# Patient Record
Sex: FEMALE | Race: WHITE
[De-identification: names, ages, dates, MRNs, and addresses within clinical notes are randomized per-mention and may not be internally consistent; named-entity substitution may affect disease eponyms.]

---

## 2017-03-29 ENCOUNTER — HOSPITAL ENCOUNTER (INPATIENT)
Dept: HOSPITAL 54 - ER | Age: 62
LOS: 1 days | Discharge: ANOTHER HOSPITAL | DRG: 544 | End: 2017-03-30
Attending: INTERNAL MEDICINE | Admitting: FAMILY MEDICINE
Payer: MEDICARE

## 2017-03-29 VITALS — HEIGHT: 67 IN | BODY MASS INDEX: 26.21 KG/M2 | WEIGHT: 167 LBS

## 2017-03-29 DIAGNOSIS — M80.08XA: Primary | ICD-10-CM

## 2017-03-29 DIAGNOSIS — M47.9: ICD-10-CM

## 2017-03-29 DIAGNOSIS — F29: ICD-10-CM

## 2017-03-29 DIAGNOSIS — E87.6: ICD-10-CM

## 2017-03-29 DIAGNOSIS — I10: ICD-10-CM

## 2017-03-29 DIAGNOSIS — F17.210: ICD-10-CM

## 2017-03-29 DIAGNOSIS — M48.06: ICD-10-CM

## 2017-03-29 DIAGNOSIS — M48.56XA: ICD-10-CM

## 2017-03-29 DIAGNOSIS — G89.29: ICD-10-CM

## 2017-03-29 NOTE — NUR
BB  FROM HOME. C/C CHRONIC LOW BACK PAIN.  PT STATES HX OF LUMBAR FX 
APPROX 20 YEARS AGO.  A+OX4.  SKIN WARM, DRY.  AFEBRILE.  RR EVEN, UNLABORED.  
VSS, NAD NOTED.  SHEETED TO ER BED 11 BY EMS PARAMEDICS.  AWAITING MD DAVALOS AND 
FURTHER ORDERS.

## 2017-03-30 VITALS — DIASTOLIC BLOOD PRESSURE: 106 MMHG | SYSTOLIC BLOOD PRESSURE: 181 MMHG

## 2017-03-30 VITALS — DIASTOLIC BLOOD PRESSURE: 98 MMHG | SYSTOLIC BLOOD PRESSURE: 175 MMHG

## 2017-03-30 VITALS — DIASTOLIC BLOOD PRESSURE: 96 MMHG | SYSTOLIC BLOOD PRESSURE: 145 MMHG

## 2017-03-30 VITALS — SYSTOLIC BLOOD PRESSURE: 175 MMHG | DIASTOLIC BLOOD PRESSURE: 98 MMHG

## 2017-03-30 LAB
APTT PPP: 23 SEC (ref 23–34)
BASOPHILS # BLD AUTO: 0 /CMM (ref 0–0.2)
BASOPHILS NFR BLD AUTO: 0.1 % (ref 0–2)
BUN SERPL-MCNC: 8 MG/DL (ref 7–18)
CALCIUM SERPL-MCNC: 8.9 MG/DL (ref 8.5–10.1)
CHLORIDE SERPL-SCNC: 106 MMOL/L (ref 98–107)
CO2 SERPL-SCNC: 24 MMOL/L (ref 21–32)
CREAT SERPL-MCNC: 0.7 MG/DL (ref 0.6–1.3)
EOSINOPHIL # BLD AUTO: 0.1 /CMM (ref 0–0.7)
EOSINOPHIL NFR BLD AUTO: 0.6 % (ref 0–6)
GFR SERPLBLD BASED ON 1.73 SQ M-ARVRAT: 85 ML/MIN (ref 60–?)
GLUCOSE SERPL-MCNC: 103 MG/DL (ref 74–106)
HCT VFR BLD AUTO: 49 % (ref 33–45)
HGB BLD-MCNC: 17.1 G/DL (ref 11.5–14.8)
INR PPP: 1 (ref 0.87–1.13)
LYMPHOCYTES NFR BLD AUTO: 1.5 /CMM (ref 0.8–4.8)
LYMPHOCYTES NFR BLD AUTO: 16.6 % (ref 20–44)
MCH RBC QN AUTO: 33 PG (ref 26–33)
MCHC RBC AUTO-ENTMCNC: 35 G/DL (ref 31–36)
MCV RBC AUTO: 93 FL (ref 82–100)
MONOCYTES NFR BLD AUTO: 0.3 /CMM (ref 0.1–1.3)
MONOCYTES NFR BLD AUTO: 3.9 % (ref 2–12)
NEUTROPHILS # BLD AUTO: 7 /CMM (ref 1.8–8.9)
NEUTROPHILS NFR BLD AUTO: 78.8 % (ref 43–81)
PLATELET # BLD AUTO: 192 /CMM (ref 150–450)
POTASSIUM SERPL-SCNC: 3.2 MMOL/L (ref 3.5–5.1)
PROTHROMBIN TIME: 10.7 SECS (ref 9.5–12.7)
RBC # BLD AUTO: 5.25 MIL/UL (ref 4–5.2)
RDW COEFFICIENT OF VARIATION: 12.5 (ref 11.5–15)
SODIUM SERPL-SCNC: 143 MMOL/L (ref 136–145)
WBC NRBC COR # BLD AUTO: 8.8 K/UL (ref 4.3–11)

## 2017-03-30 RX ADMIN — Medication PRN MG: at 12:07

## 2017-03-30 RX ADMIN — HYDROMORPHONE HYDROCHLORIDE PRN MG: 1 INJECTION, SOLUTION INTRAMUSCULAR; INTRAVENOUS; SUBCUTANEOUS at 20:21

## 2017-03-30 RX ADMIN — Medication PRN MG: at 04:26

## 2017-03-30 RX ADMIN — Medication PRN EACH: at 16:01

## 2017-03-30 RX ADMIN — Medication PRN MG: at 02:30

## 2017-03-30 RX ADMIN — Medication PRN MG: at 09:14

## 2017-03-30 RX ADMIN — Medication PRN MG: at 06:38

## 2017-03-30 RX ADMIN — Medication PRN EACH: at 22:10

## 2017-03-30 RX ADMIN — HYDROMORPHONE HYDROCHLORIDE PRN MG: 1 INJECTION, SOLUTION INTRAMUSCULAR; INTRAVENOUS; SUBCUTANEOUS at 14:14

## 2017-03-30 RX ADMIN — Medication PRN MG: at 04:30

## 2017-03-30 NOTE — NUR
Will need higher level of care transfer for neuro-surgery thoracic burst fracture with 
stenosis.Referred to Pat at [facility] transfer ctr [phone], faxed 
clinicals,accepting MD will be . Awaiting financial clearance for acceptance  

-------------------------------------------------------------------------------

Addendum: 03/30/17 at 1632 by FOZIA LYLES RN

-------------------------------------------------------------------------------

Amended: Links added.

## 2017-03-30 NOTE — NUR
RN NOTES:



Pt's Potassium Level is 3.2 (low), ordered to give K-dur 40 meqs tablet but pt unable to 
swallow tablet. Called Pharmacy & spoke w/ Tiffanie, tablet shifted to Klor Con powder 40 
meqs instead.

## 2017-03-30 NOTE — NUR
RN CLOSING NOTE



EMS ARRIVED TO TRANSPORT PT TO [facility]. PT REMAINS IN NO ACUTE DISTRESS IN 
BED. PT DID NOT HAVE ANY SIGNIFICANT CHANGE IN CONDITION DURING SHIFT. PT LEFT HOSPITAL IN 
STABLE CONDITION WITH EMS.

## 2017-03-30 NOTE — NUR
RN NOTES:



Pt seen & examined by Dr. Lindsay w/ orders & carried out. Orders to DC Morphine 2 mg IVP q2H 
& start on Dilaudid 2 mg IVP q6h PRN.

## 2017-03-30 NOTE — NUR
Referred to Jojo at [facility] per Noe- NP of  request [phone]. 
Faxed clinicals and face sheet. Pt has been accepted - room# 6522, report to be called to 
[phone] Address: [facility] [place] [address]. Arranged with [facility] , available 
ambulance for pick will be 9:30-10pm tonight. Patient is aware and agreed with the transfer

## 2017-03-30 NOTE — NUR
RN NOTES:



Called Geropysche unit c/o Chao re: ordered pyschiatric evaluation of the pt. Faxed face 
sheet c/o Amandeep.

## 2017-03-30 NOTE — NUR
RN NOTES:



Rec'd call from ADRIEN Lopez, pt is for transfer to [facility] under Dr. Garza. 
Dr. Lindsay informed of the plan w/ no orders made.

## 2017-03-30 NOTE — NUR
RN INITIAL NOTES:



PT RECEIVED FROM ER. PT IS ALERT AND ORIENTED X 4. PT HAS COLEMAN CATH DRAINING WELL. PT HAS 
IV HEPLOCK IN RIGHT HAND. FLUSHED AND PATENT WITH NO SIGNS OF INFILTRATION. PT IS ON ROOM 
AIR AND TOLERATING WELL. BED IN LOW POSITION AND LOCKED WITH SIDE RAILS UP. CALL LIGHT 
WITHIN REACH.

## 2017-03-30 NOTE — NUR
RN NOTES:



As per SHILPI Hampton, pt is for transfer to [facility] at [facility] for surgical 
procedure instead. Pt informed about the hospital change & agreed on it.

## 2017-03-30 NOTE — NUR
RN CLOSING NOTES:



No acute changes noted w/in shift. Pt not in any distress. Pt c/o back pain d/t compression 
fracture on T12, pain medication given as scheduled. Pt kept flat w/ HOB slightly elevated. 
FC patent & intact w/ adequate urine output. Instructed to be in supine position due to 
condition. Verbalized understanding. Dr. Lindsay made aware of hospital change. Needs 
attended. Kept comfortable & warm. Call light placed w/in reached. Bed kept low & in locked 
position. Pt endorsed to TRUONG Denny.

## 2017-03-30 NOTE — NUR
RN CLOSING NOTES:



PT IS RESTING IN BED WITH NO S/S OF DISTRESS. BED IN LOW LOCKED POSITION. CALL LIGHT WITHIN 
REACH. CARE PROVIDED AS ORDERED. ENDORSE PT TO AM SHIFT FOR NEHEMIAS.

## 2017-03-30 NOTE — NUR
RN NOTES:



Informed Dr. Lindsay regarding the transfer to hospital. SHILPI Hampton made the order for transfer 
in behalf of Dr. Lindsay. All DC paperworks done c/o SHILPI Hampton.

## 2017-03-30 NOTE — NUR
RN INITIAL NOTE



RECEIVED PT IN NO ACUTE DISTRESS IN BED. PT IS A/O X 4 AND ABLE TO MAKE NEEDS KNOWN. PT IS 
C/O PAIN AT THIS TIME. PAIN MANAGEMENT INITIATED AND PT HAS DILAUDID 2MG Q6H DUE @ 2015. PT 
IS NOT C/O ANY SOB OR DIFFICULTY BREATHING. PT HAS F/C THAT IS CLEAN DRY INTACT AND PATENT 
WITH CLEAR YELLOW URINE DRAINING. PT HAS RHAND 22G THAT IS CLEAN DRY INTACT AND PATENT WITH 
SALINE FLUSH. PT AWAITING TRANSFER TO [facility] FOR SURGERY IN AM. AWAITING 
EMS ARRIVAL TO TRANSPORT PT. BED IN LOW LOCK POSITION WITH RIALS UP X 2. CALL LIGHT WITHIN 
REACH AND ALL SAFETY MEASURES ENSURED AND CARRIED OUT. WILL CONTINUE TO MONITOR PT.

## 2017-03-30 NOTE — NUR
RN INITIAL NOTES:



Rec'd pt awake on bed, A&Ox4, c/o severe pain on lower back, 10/10 rate, pt on pain meds. Pt 
has R hand G22 SL, flushed, patent & intact w/ no signs of infection/ infiltration noted. 
Call light placed w/in reached. Provided comfort. Bed kept low & in locked position. Will 
continue to monitor.

## 2017-11-07 ENCOUNTER — HOSPITAL ENCOUNTER (INPATIENT)
Dept: HOSPITAL 10 - E/R | Age: 62
LOS: 4 days | Discharge: HOME | DRG: 446 | End: 2017-11-11
Attending: INTERNAL MEDICINE | Admitting: INTERNAL MEDICINE
Payer: MEDICARE

## 2017-11-07 VITALS — SYSTOLIC BLOOD PRESSURE: 164 MMHG | DIASTOLIC BLOOD PRESSURE: 93 MMHG | RESPIRATION RATE: 18 BRPM

## 2017-11-07 VITALS
HEIGHT: 67 IN | BODY MASS INDEX: 25.43 KG/M2 | WEIGHT: 162.04 LBS | BODY MASS INDEX: 25.43 KG/M2 | WEIGHT: 162.04 LBS | HEIGHT: 67 IN

## 2017-11-07 VITALS — HEART RATE: 89 BPM | DIASTOLIC BLOOD PRESSURE: 84 MMHG | SYSTOLIC BLOOD PRESSURE: 138 MMHG

## 2017-11-07 VITALS — TEMPERATURE: 98.3 F

## 2017-11-07 DIAGNOSIS — F17.200: ICD-10-CM

## 2017-11-07 DIAGNOSIS — F11.99: ICD-10-CM

## 2017-11-07 DIAGNOSIS — M40.204: ICD-10-CM

## 2017-11-07 DIAGNOSIS — K76.0: ICD-10-CM

## 2017-11-07 DIAGNOSIS — K80.51: Primary | ICD-10-CM

## 2017-11-07 DIAGNOSIS — I10: ICD-10-CM

## 2017-11-07 DIAGNOSIS — G89.29: ICD-10-CM

## 2017-11-07 DIAGNOSIS — R33.9: ICD-10-CM

## 2017-11-07 LAB
ADD UMIC: YES
ALBUMIN SERPL-MCNC: 4.3 G/DL (ref 3.3–4.9)
ALBUMIN/GLOB SERPL: 1.07 {RATIO}
ALP SERPL-CCNC: 129 IU/L (ref 42–121)
ALT SERPL-CCNC: 80 IU/L (ref 13–69)
ANION GAP SERPL CALC-SCNC: 18 MMOL/L (ref 8–16)
AST SERPL-CCNC: 83 IU/L (ref 15–46)
BACTERIA #/AREA URNS HPF: (no result) /HPF
BASOPHILS # BLD AUTO: 0 10^3/UL (ref 0–0.1)
BASOPHILS NFR BLD: 0.4 % (ref 0–2)
BILIRUB DIRECT SERPL-MCNC: 0 MG/DL (ref 0–0.2)
BILIRUB SERPL-MCNC: 0.6 MG/DL (ref 0.2–1.3)
BUN SERPL-MCNC: 12 MG/DL (ref 7–20)
CALCIUM SERPL-MCNC: 8.9 MG/DL (ref 8.4–10.2)
CHLORIDE SERPL-SCNC: 100 MMOL/L (ref 97–110)
CO2 SERPL-SCNC: 27 MMOL/L (ref 21–31)
COLOR UR: YELLOW
CREAT SERPL-MCNC: 0.69 MG/DL (ref 0.44–1)
EOSINOPHIL # BLD: 0 10^3/UL (ref 0–0.5)
EOSINOPHIL NFR BLD: 0.1 % (ref 0–7)
ERYTHROCYTE [DISTWIDTH] IN BLOOD BY AUTOMATED COUNT: 11.9 % (ref 11.5–14.5)
GLOBULIN SER-MCNC: 4 G/DL (ref 1.3–3.2)
GLUCOSE SERPL-MCNC: 122 MG/DL (ref 70–220)
GLUCOSE UR STRIP-MCNC: NEGATIVE MG/DL
HCT VFR BLD CALC: 46 % (ref 37–47)
HGB BLD-MCNC: 15.7 G/DL (ref 12–16)
KETONES UR STRIP.AUTO-MCNC: (no result) MG/DL
LYMPHOCYTES # BLD AUTO: 1.2 10^3/UL (ref 0.8–2.9)
LYMPHOCYTES NFR BLD AUTO: 12.5 % (ref 15–51)
MCH RBC QN AUTO: 31.5 PG (ref 29–33)
MCHC RBC AUTO-ENTMCNC: 34.1 G/DL (ref 32–37)
MCV RBC AUTO: 92.4 FL (ref 82–101)
MONOCYTES # BLD: 0.7 10^3/UL (ref 0.3–0.9)
MONOCYTES NFR BLD: 7.4 % (ref 0–11)
MUCOUS THREADS #/AREA URNS HPF: (no result) /HPF
NEUTROPHILS # BLD: 7.3 10^3/UL (ref 1.6–7.5)
NEUTROPHILS NFR BLD AUTO: 79.2 % (ref 39–77)
NITRITE UR QL STRIP.AUTO: NEGATIVE MG/DL
NRBC # BLD MANUAL: 0 10^3/UL (ref 0–0)
NRBC BLD AUTO-RTO: 0 /100WBC (ref 0–0)
PLATELET # BLD: 201 10^3/UL (ref 140–415)
PMV BLD AUTO: 9.3 FL (ref 7.4–10.4)
POTASSIUM SERPL-SCNC: 4 MMOL/L (ref 3.5–5.1)
PROT SERPL-MCNC: 8.3 G/DL (ref 6.1–8.1)
RBC # BLD AUTO: 4.98 10^6/UL (ref 4.2–5.4)
RBC # UR AUTO: NEGATIVE MG/DL
SODIUM SERPL-SCNC: 141 MMOL/L (ref 135–144)
SQUAMOUS #/AREA URNS HPF: (no result) /HPF
UR ASCORBIC ACID: NEGATIVE MG/DL
UR BILIRUBIN (DIP): NEGATIVE MG/DL
UR CLARITY: (no result)
UR PH (DIP): 8 (ref 5–9)
UR RBC: 3 /HPF (ref 0–5)
UR SPECIFIC GRAVITY (DIP): 1.01 (ref 1–1.03)
UR TOTAL PROTEIN (DIP): NEGATIVE MG/DL
UROBILINOGEN UR STRIP-ACNC: NEGATIVE MG/DL
WBC # BLD AUTO: 9.2 10^3/UL (ref 4.8–10.8)
WBC # UR STRIP: (no result) LEU/UL

## 2017-11-07 PROCEDURE — 74330 X-RAY BILE/PANC ENDOSCOPY: CPT

## 2017-11-07 PROCEDURE — 70450 CT HEAD/BRAIN W/O DYE: CPT

## 2017-11-07 PROCEDURE — 74176 CT ABD & PELVIS W/O CONTRAST: CPT

## 2017-11-07 PROCEDURE — C2617 STENT, NON-COR, TEM W/O DEL: HCPCS

## 2017-11-07 PROCEDURE — 85025 COMPLETE CBC W/AUTO DIFF WBC: CPT

## 2017-11-07 PROCEDURE — 80053 COMPREHEN METABOLIC PANEL: CPT

## 2017-11-07 PROCEDURE — 74181 MRI ABDOMEN W/O CONTRAST: CPT

## 2017-11-07 PROCEDURE — 96375 TX/PRO/DX INJ NEW DRUG ADDON: CPT

## 2017-11-07 PROCEDURE — 81003 URINALYSIS AUTO W/O SCOPE: CPT

## 2017-11-07 PROCEDURE — 87086 URINE CULTURE/COLONY COUNT: CPT

## 2017-11-07 PROCEDURE — 81001 URINALYSIS AUTO W/SCOPE: CPT

## 2017-11-07 PROCEDURE — 96374 THER/PROPH/DIAG INJ IV PUSH: CPT

## 2017-11-07 RX ADMIN — FOLIC ACID SCH MLS/HR: 5 INJECTION, SOLUTION INTRAMUSCULAR; INTRAVENOUS; SUBCUTANEOUS at 23:24

## 2017-11-07 NOTE — ERD
ER Documentation


Chief Complaint


Chief Complaint


vieira, lower back pain x 2 days





HPI


This is a 62-year-old female complains of dull left-sided flank pain for the 

past 2 days the pain is located in the left flank as sharp she says is worse 

with movement and bending over.  She does have some dysuria with smelly urine.  

She also complains of dull headache today.  Denies any fever no chest pain 

shortness of breath abdominal pain vomiting diarrhea.  The patient says she 

takes blood pressure medication but her blood pressures been quite high today.  

No headache dizziness focal neurological complaints.





ROS


All systems reviewed and are negative except as per history of present illness.





Medications


Home Meds


Active Scripts


Bisacodyl* (Bisacodyl*) 5 Mg Tablet.dr, 5 MG PO DAILY Y for CONSTIPATION for 30 

Days


   Prov:MYRA LACEY. NP         5/31/16


Sennosides* (Senokot*) 8.6 Mg Tablet, 2 TAB PO BID for 30 Days, TAB


   Prov:MYRA LACEY. NP         5/31/16


Multivitamins* (Theragran*) 1 Tab Tab, 1 TAB PO DAILY for 30 Days, TAB


   Prov:MYRA LACEY. NP         5/31/16


Methadone Hcl* (Methadone*) 10 Mg Tab, 60 MG PO DAILY for 30 Days, TAB


   Prov:MYRA LACEY. NP         5/31/16


Ibuprofen* (Ibuprofen*) 400 Mg Tablet, 400 MG PO Q6H for 30 Days, TAB


   Prov:LACEYSHANICEA V. NP         5/31/16


Hydralazine Hcl* (Apresoline*) 10 Mg Tab, 10 MG PO Q8 for 30 Days, TAB


   Prov:LACEYMYRA. NP         5/31/16


Amlodipine Besylate* (Amlodipine Besylate*) 5 Mg Tablet, 5 MG PO BID for 30 Days

, TAB


   Prov:LACEYMYRA. NP         5/31/16


Acetaminophen* (Acephen*) 650 Mg Supp, 650 MG WI Q6H Y for PAIN LEVEL 1-3 OR 

FEVER for 30 Days, SUPP


   Prov:LACEYSHANICEA V. NP         5/31/16


Reported Medications


Methadone Hcl* (Methadone*) 10 Mg Tab, 60 MG PO DAILY, TAB


   6/8/16





Allergies


Allergies:  


Coded Allergies:  


     vancomycin (Verified  Allergy, Severe, SWELLING, 6/2/16)


     peach (Verified  Allergy, Unknown, Hives, 6/8/16)





PMhx/Soc


History of Surgery:  Yes (Back sx, Right ankle Sx)


Anesthesia Reaction:  No


Hx Neurological Disorder:  No


Hx Respiratory Disorders:  Yes (Bronchitis.)


Hx Cardiac Disorders:  Yes (HTN)


Hx Psychiatric Problems:  Yes (Depression)


Hx Miscellaneous Medical Probl:  Yes (Chronic pain)


Hx Alcohol Use:  Yes (DAILY)


Hx Substance Use:  Yes (Hx Heroine use)


Hx Tobacco Use:  Yes (2 cig a day.)


Smoking Status:  Current every day smoker





FmHx


Family History:  No coronary disease





Physical Exam


Vitals





Vital Signs








  Date Time  Temp Pulse Resp B/P Pulse Ox O2 Delivery O2 Flow Rate FiO2


 


11/7/17 18:50 98.3 108 22 150/76 99 Room Air  


 


11/7/17 16:09 98.6 98 18     








Physical Exam


Const:      Well-developed, well-nourished


 Head:        Atraumatic, normocephalic


 Eyes:       Normal Conjunctiva, PERRLA, EOMI, normal sclera, no nystagmus


 ENT:         Normal External Ears, Nose and Mouth, moist mucus membranes.


 Neck:        Full range of motion.  No meningismus, no lymphadenopathy.


 Resp:         Clear to auscultation bilaterally, no wheezing, rhonchi, rales


 Cardio:       Regular rate and rhythm, no murmurs, S1 S2 present


 Abd:         Soft, non tender x 4, non distended. Normal bowel sounds, no 

guarding or rebound, no pulsitile abdominal masses or bruits


 Skin:         No petechiae or rashes, no ecchymosis , no maculopapular rash


 Back:        Tender left lumbar para musculature with palpation with movement


 Ext:          No cyanosis, or edema, FROM x 4, normal inspection, 

neurovascularly intact x 4


 Neur:        Awake and alert, STR 5/5 x 4, sensation intact x 4, no focal 

findings, cerebellum intact


 Psych:        Normal Mood and Affect


Result Diagram:  


11/7/17 1825 11/7/17 1825





Results 24 hrs





 Laboratory Tests








Test


  11/7/17


18:25


 


White Blood Count 9.210^3/ul 


 


Red Blood Count 4.9810^6/ul 


 


Hemoglobin 15.7g/dl 


 


Hematocrit 46.0% 


 


Mean Corpuscular Volume 92.4fl 


 


Mean Corpuscular Hemoglobin 31.5pg 


 


Mean Corpuscular Hemoglobin


Concent 34.1g/dl 


 


 


Red Cell Distribution Width 11.9% 


 


Platelet Count 96306^3/UL 


 


Mean Platelet Volume 9.3fl 


 


Neutrophils % 79.2% 


 


Lymphocytes % 12.5% 


 


Monocytes % 7.4% 


 


Eosinophils % 0.1% 


 


Basophils % 0.4% 


 


Nucleated Red Blood Cells % 0.0/100WBC 


 


Neutrophils # 7.310^3/ul 


 


Lymphocytes # 1.210^3/ul 


 


Monocytes # 0.710^3/ul 


 


Eosinophils # 0.010^3/ul 


 


Basophils # 0.010^3/ul 


 


Nucleated Red Blood Cells # 0.010^3/ul 


 


Urine Color YELLOW 


 


Urine Clarity


  SLIGHTLY


CLOUDY


 


Urine pH 8.0 


 


Urine Specific Gravity 1.013 


 


Urine Ketones 1+mg/dL 


 


Urine Nitrite NEGATIVEmg/dL 


 


Urine Bilirubin NEGATIVEmg/dL 


 


Urine Urobilinogen NEGATIVEmg/dL 


 


Urine Leukocyte Esterase 1+Eulalio/ul 


 


Urine Microscopic RBC 3/HPF 


 


Urine Microscopic WBC 5/HPF 


 


Urine Squamous Epithelial


Cells FEW/HPF 


 


 


Urine Bacteria FEW/HPF 


 


Urine Mucus FEW/HPF 


 


Urine Hemoglobin NEGATIVEmg/dL 


 


Urine Glucose NEGATIVEmg/dL 


 


Urine Total Protein NEGATIVEmg/dl 


 


Sodium Level 141mmol/L 


 


Potassium Level 4.0mmol/L 


 


Chloride Level 100mmol/L 


 


Carbon Dioxide Level 27mmol/L 


 


Anion Gap 18 


 


Blood Urea Nitrogen 12mg/dl 


 


Creatinine 0.69mg/dl 


 


Glucose Level 122mg/dl 


 


Calcium Level 8.9mg/dl 


 


Total Bilirubin 0.6mg/dl 


 


Direct Bilirubin 0.00mg/dl 


 


Indirect Bilirubin 0.6mg/dl 


 


Aspartate Amino Transf


(AST/SGOT) 83IU/L 


 


 


Alanine Aminotransferase


(ALT/SGPT) 80IU/L 


 


 


Alkaline Phosphatase 129IU/L 


 


Total Protein 8.3g/dl 


 


Albumin 4.3g/dl 


 


Globulin 4.00g/dl 


 


Albumin/Globulin Ratio 1.07 








 Current Medications








 Medications


  (Trade)  Dose


 Ordered  Sig/Yasmany


 Route


 PRN Reason  Start Time


 Stop Time Status Last Admin


Dose Admin


 


 Nicardipine HCl


 60 mg  60 mg  ONCE  ONCE


 PO


   11/7/17 18:00


 11/7/17 18:01 Cancel  


 


 


 Sodium Chloride


  (NS)  1,000 ml @ 


 1,000 mls/hr  Q1H STAT


 IV


   11/7/17 17:50


 11/7/17 18:49 DC  


 


 


 Hydromorphone HCl


  (Dilaudid)  1 mg  ONCE  STAT


 IV


   11/7/17 17:50


 11/7/17 17:52 DC  


 


 


 Ondansetron HCl


  (Zofran Inj)  4 mg  ONCE  STAT


 IV


   11/7/17 17:50


 11/7/17 17:52 DC  


 


 


 Nicardipine HCl


  (Cardene)  30 mg  ONCE  ONCE


 PO


   11/7/17 18:00


 11/7/17 18:01 DC 11/7/17 17:57


 


 


 Hydralazine HCl


  (Apresoline)  10 mg  ONCE  ONCE


 IV


   11/7/17 18:00


 11/7/17 18:01 DC  


 











Procedures/MDM





PROCEDURE:   CT Abdomen and Pelvis without contrast. 


 


CLINICAL INDICATION:   Abdominal pelvic pain.  Left flank pain 


 


TECHNIQUE:   CT scan of the abdomen and pelvis without contrast was performed 

on a multi-detector high-resolution CT scanner. The patient was scanned without 

IV contrast. Coronal and sagittal reformatted images were obtained from the 

axial source images. CTDI equals 10.09 mGy, and DLP equals 533.28 mGy-cm.


 


One or more of the following dose reduction techniques were used:


- Automated exposure control.


- Adjustment of the mA and/or kV according to patient size.


- Use of iterative reconstruction technique.


 


COMPARISON:   CT 6/9/2016; MR 6/8/2016 


 


FINDINGS:


 


Lower thorax: Normal.


 


Liver: Diffuse fatty infiltration of the liver. Mild intrahepatic biliary 

dilatation. Minimal intrahepatic pneumobilia. No focal mass lesion.


 


Biliary: Numerous gallstones within the cystic duct and common bile duct.  

Mildly distended gallbladder, with minimal extrahepatic pneumobilia. 


 


Pancreas: Normal.


 


Spleen: Mild splenomegaly.


 


Adrenal Glands: Normal.


 


Genitourinary: Normal kidneys and ureters. Tiny gas bubble within the bladder. 

Query previous instrumentation. No bladder wall thickening or inflammation is 

seen.


 


Gastrointestinal: Subtle small duodenal diverticulum located adjacent to the 

head of the pancreas.


 


Lymph nodes: Normal.


 


Vascular: Atherosclerotic vascular calcifications.


 


Peritoneum/mesentery: Normal. No free fluid or free air.


 


Reproductive organs: Normal.


 


Musculoskeletal: Postsurgical deformity of the T11-L2 vertebral bodies. 

Exaggerated kyphosis at this level is present.


 


IMPRESSION:


 


1.  Severe extensive choledocholithiasis with mild obstructive biliary 

dilatation. The degree of biliary dilatation is actually improved when compared 

to the prior studies. The numerous stones within the duct are markedly worse 

when compared to the prior study.


2.  Subtle minimal intrahepatic and extrahepatic pneumobilia. The patient is 

status post ERCP with presumed previous sphincterotomy.


3.  Diffuse fatty infiltration of the liver chronic and stable.


4.  Extensive postsurgical changes of the thoracolumbar junction of the spine.


5.  Mild splenomegaly, stable over time.


  


 


RPTAT: HMJB


_____________________________________________ 


.Frank Leal MD, MD           Date    Time 


Electronically viewed and signed by .Frank Leal MD, MD on 11/07/2017 19:17 


 


D:  11/07/2017 19:17  T:  11/07/2017 19:17


.B/





CC: NELIDA SALINAS DO


























PROCEDURE:   CT Brain without contrast. 


 


CLINICAL INDICATION:  Altered mental status.


 


TECHNIQUE:   A CT of the brain was performed on multidetector high-resolution 

CT scanner utilizing axial sections from the skull base through the vertex 

without contrast.  The scan was reviewed in soft tissue brain and high 

frequency resolution bone algorithm windows.  Images were reviewed on a high-

resolution PACS workstation. One or more the following does reduction 

techniques were utilized: Automated exposure control, adjustment of the mA/ or 

kV according to patient's size, or use of iterative reconstruction technique. 

The exam CTDI = 43.27 mGy and the DLP = 870.09 mGy-cm. 


 


COMPARISON:   Brain CT 05/23/2016.


 


FINDINGS:


 


The ventricles and sulci are mildly prominent indicative of volume loss.  There 

is no intracranial hemorrhage, mass effect or midline shift.  No abnormal intra-

axial or extra-axial fluid collections are seen. The gray/white matter 

differentiation is preserved. 


 


There are mild scattered foci of hypoattenuation in the white matter, which are 

nonspecific in etiology but likely reflect chronic small vessel ischemic 

changes.  There are mild intracranial vascular calcifications consistent with 

atherosclerosis. The visualized paranasal sinuses are essentially clear. 


 


IMPRESSION:


 


1.  No acute intracranial hemorrhage, transcortical infarction or mass effect.


 


2.  Mild intracranial atherosclerosis and chronic small vessel ischemic 

changes. 


 


3.  Mild generalized cerebral volume loss.


 


 


RPTAT: HH


_____________________________________________ 


.Soy Alcantara MD, MD           Date    Time 


Electronically viewed and signed by .Soy Alcantara MD, MD on 11/07/2017 19:

03 


 


D:  11/07/2017 19:03  T:  11/07/2017 19:03


.N/





CC: NELIDA SALINAS DO























Spoke with Dr. Molina of surgery and Dr. holland of GI.  The patient will need 

to be admitted to the hospital for MRCP/ERCP and follow-up gallbladder removal.


Left flank pain is likely musculoskeletal in nature there is no signs of kidney 

stones or other left flank pathology.  Patient's blood pressures improved.





Departure


Diagnosis:  


 Primary Impression:  


 Pneumobilia


 Additional Impressions:  


 Choledocholithiasis


 Uncontrolled hypertension


Condition:  Stable











NELIDA SALINAS DO Nov 7, 2017 19:19

## 2017-11-07 NOTE — RADRPT
PROCEDURE:   CT Abdomen and Pelvis without contrast. 

 

CLINICAL INDICATION:   Abdominal pelvic pain.  Left flank pain 

 

TECHNIQUE:   CT scan of the abdomen and pelvis without contrast was performed on a multi-detector hi
gh-resolution CT scanner. The patient was scanned without IV contrast. Coronal and sagittal reformat
saritha images were obtained from the axial source images. CTDI equals 10.09 mGy, and DLP equals 533.28 
mGy-cm.

 

One or more of the following dose reduction techniques were used:

- Automated exposure control.

- Adjustment of the mA and/or kV according to patient size.

- Use of iterative reconstruction technique.

 

COMPARISON:   CT 6/9/2016; MR 6/8/2016 

 

FINDINGS:

 

Lower thorax: Normal.

 

Liver: Diffuse fatty infiltration of the liver. Mild intrahepatic biliary dilatation. Minimal intrah
epatic pneumobilia. No focal mass lesion.

 

Biliary: Numerous gallstones within the cystic duct and common bile duct.  Mildly distended gallblad
[place], with minimal extrahepatic pneumobilia. 

 

Pancreas: Normal.

 

Spleen: Mild splenomegaly.

 

Adrenal Glands: Normal.

 

Genitourinary: Normal kidneys and ureters. Tiny gas bubble within the bladder. Query previous instru
mentation. No bladder wall thickening or inflammation is seen.

 

Gastrointestinal: Subtle small duodenal diverticulum located adjacent to the head of the pancreas.

 

Lymph nodes: Normal.

 

Vascular: Atherosclerotic vascular calcifications.

 

Peritoneum/mesentery: Normal. No free fluid or free air.

 

Reproductive organs: Normal.

 

Musculoskeletal: Postsurgical deformity of the T11-L2 vertebral bodies. Exaggerated kyphosis at this
 level is present.

 

IMPRESSION:

 

1.  Severe extensive choledocholithiasis with mild obstructive biliary dilatation. The degree of jerald
iary dilatation is actually improved when compared to the prior studies. The numerous stones within 
the duct are markedly worse when compared to the prior study.

2.  Subtle minimal intrahepatic and extrahepatic pneumobilia. The patient is status post ERCP with p
resumed previous sphincterotomy.

3.  Diffuse fatty infiltration of the liver chronic and stable.

4.  Extensive postsurgical changes of the thoracolumbar junction of the spine.

5.  Mild splenomegaly, stable over time.

  

 

RPTAT: HMJB

_____________________________________________ 

.Frank Leal MD, MD           Date    Time 

Electronically viewed and signed by .Frank Leal MD, MD on 11/07/2017 19:17 

 

D:  11/07/2017 19:17  T:  11/07/2017 19:17

.B/

## 2017-11-07 NOTE — RADRPT
PROCEDURE:   CT Brain without contrast. 

 

CLINICAL INDICATION:  Altered mental status.

 

TECHNIQUE:   A CT of the brain was performed on multidetector high-resolution CT scanner utilizing a
xial sections from the skull base through the vertex without contrast.  The scan was reviewed in sof
t tissue brain and high frequency resolution bone algorithm windows.  Images were reviewed on a high
-resolution PACS workstation. One or more the following does reduction techniques were utilized: Aut
omated exposure control, adjustment of the mA/ or kV according to patient's size, or use of iterativ
e reconstruction technique. The exam CTDI = 43.27 mGy and the DLP = 870.09 mGy-cm. 

 

COMPARISON:   Brain CT 05/23/2016.

 

FINDINGS:

 

The ventricles and sulci are mildly prominent indicative of volume loss.  There is no intracranial h
emorrhage, mass effect or midline shift.  No abnormal intra-axial or extra-axial fluid collections a
re seen. The gray/white matter differentiation is preserved. 

 

There are mild scattered foci of hypoattenuation in the white matter, which are nonspecific in etiol
ogy but likely reflect chronic small vessel ischemic changes.  There are mild intracranial vascular 
calcifications consistent with atherosclerosis. The visualized paranasal sinuses are essentially apple
ar. 

 

IMPRESSION:

 

1.  No acute intracranial hemorrhage, transcortical infarction or mass effect.

 

2.  Mild intracranial atherosclerosis and chronic small vessel ischemic changes. 

 

3.  Mild generalized cerebral volume loss.

 

 

RPTAT: HH

_____________________________________________ 

.Soy Alcantara MD, MD           Date    Time 

Electronically viewed and signed by .Soy Alcantara MD, MD on 11/07/2017 19:03 

 

D:  11/07/2017 19:03  T:  11/07/2017 19:03

.N/

## 2017-11-08 VITALS — DIASTOLIC BLOOD PRESSURE: 110 MMHG | RESPIRATION RATE: 28 BRPM | SYSTOLIC BLOOD PRESSURE: 168 MMHG | HEART RATE: 88 BPM

## 2017-11-08 VITALS — HEART RATE: 84 BPM | SYSTOLIC BLOOD PRESSURE: 148 MMHG | RESPIRATION RATE: 25 BRPM | DIASTOLIC BLOOD PRESSURE: 86 MMHG

## 2017-11-08 VITALS — DIASTOLIC BLOOD PRESSURE: 92 MMHG | SYSTOLIC BLOOD PRESSURE: 123 MMHG | RESPIRATION RATE: 24 BRPM

## 2017-11-08 VITALS — SYSTOLIC BLOOD PRESSURE: 114 MMHG | RESPIRATION RATE: 18 BRPM | DIASTOLIC BLOOD PRESSURE: 74 MMHG

## 2017-11-08 VITALS — SYSTOLIC BLOOD PRESSURE: 182 MMHG | DIASTOLIC BLOOD PRESSURE: 90 MMHG | HEART RATE: 102 BPM

## 2017-11-08 VITALS — SYSTOLIC BLOOD PRESSURE: 153 MMHG | RESPIRATION RATE: 33 BRPM | DIASTOLIC BLOOD PRESSURE: 103 MMHG | HEART RATE: 88 BPM

## 2017-11-08 VITALS — DIASTOLIC BLOOD PRESSURE: 94 MMHG | RESPIRATION RATE: 20 BRPM | SYSTOLIC BLOOD PRESSURE: 196 MMHG

## 2017-11-08 VITALS — SYSTOLIC BLOOD PRESSURE: 188 MMHG | RESPIRATION RATE: 20 BRPM | DIASTOLIC BLOOD PRESSURE: 100 MMHG

## 2017-11-08 VITALS — SYSTOLIC BLOOD PRESSURE: 123 MMHG | DIASTOLIC BLOOD PRESSURE: 92 MMHG | RESPIRATION RATE: 24 BRPM

## 2017-11-08 VITALS — HEART RATE: 90 BPM | RESPIRATION RATE: 36 BRPM | SYSTOLIC BLOOD PRESSURE: 156 MMHG | DIASTOLIC BLOOD PRESSURE: 94 MMHG

## 2017-11-08 VITALS — SYSTOLIC BLOOD PRESSURE: 131 MMHG | HEART RATE: 86 BPM | DIASTOLIC BLOOD PRESSURE: 86 MMHG | RESPIRATION RATE: 26 BRPM

## 2017-11-08 VITALS — RESPIRATION RATE: 27 BRPM | HEART RATE: 87 BPM | DIASTOLIC BLOOD PRESSURE: 92 MMHG | SYSTOLIC BLOOD PRESSURE: 147 MMHG

## 2017-11-08 LAB
ADD UMIC: NO
BACTERIA #/AREA URNS HPF: (no result) /HPF
COLOR UR: (no result)
GLUCOSE UR STRIP-MCNC: NEGATIVE MG/DL
KETONES UR STRIP.AUTO-MCNC: NEGATIVE MG/DL
NITRITE UR QL STRIP.AUTO: NEGATIVE MG/DL
RBC # UR AUTO: NEGATIVE MG/DL
UR ASCORBIC ACID: NEGATIVE MG/DL
UR BILIRUBIN (DIP): NEGATIVE MG/DL
UR CLARITY: CLEAR
UR PH (DIP): 8 (ref 5–9)
UR RBC: 3 /HPF (ref 0–5)
UR SPECIFIC GRAVITY (DIP): 1.01 (ref 1–1.03)
UR TOTAL PROTEIN (DIP): NEGATIVE MG/DL
UROBILINOGEN UR STRIP-ACNC: NEGATIVE MG/DL
WBC # UR STRIP: NEGATIVE LEU/UL

## 2017-11-08 PROCEDURE — 0F798DZ DILATION OF COMMON BILE DUCT WITH INTRALUMINAL DEVICE, VIA NATURAL OR ARTIFICIAL OPENING ENDOSCOPIC: ICD-10-PCS | Performed by: INTERNAL MEDICINE

## 2017-11-08 PROCEDURE — 0FC98ZZ EXTIRPATION OF MATTER FROM COMMON BILE DUCT, VIA NATURAL OR ARTIFICIAL OPENING ENDOSCOPIC: ICD-10-PCS | Performed by: INTERNAL MEDICINE

## 2017-11-08 RX ADMIN — FOLIC ACID SCH MLS/HR: 5 INJECTION, SOLUTION INTRAMUSCULAR; INTRAVENOUS; SUBCUTANEOUS at 08:59

## 2017-11-08 RX ADMIN — AMLODIPINE BESYLATE SCH MG: 10 TABLET ORAL at 16:50

## 2017-11-08 RX ADMIN — LOSARTAN POTASSIUM SCH MG: 50 TABLET, FILM COATED ORAL at 16:49

## 2017-11-08 RX ADMIN — HYDROMORPHONE HYDROCHLORIDE PRN MG: 1 INJECTION, SOLUTION INTRAMUSCULAR; INTRAVENOUS; SUBCUTANEOUS at 05:07

## 2017-11-08 RX ADMIN — PIPERACILLIN AND TAZOBACTAM SCH MLS/HR: 3; .375 INJECTION, POWDER, LYOPHILIZED, FOR SOLUTION INTRAVENOUS; PARENTERAL at 16:50

## 2017-11-08 RX ADMIN — METHADONE HYDROCHLORIDE SCH MG: 10 TABLET ORAL at 16:50

## 2017-11-08 RX ADMIN — HYDRALAZINE HYDROCHLORIDE PRN MG: 20 INJECTION INTRAMUSCULAR; INTRAVENOUS at 02:32

## 2017-11-08 RX ADMIN — HYDRALAZINE HYDROCHLORIDE PRN MG: 20 INJECTION INTRAMUSCULAR; INTRAVENOUS at 08:08

## 2017-11-08 NOTE — CONS
Date/Time of Note


Date/Time of Note


DATE: 11/8/17 


TIME: 21:16





Assessment/Plan


Assessment/Plan


Chief Complaint/Hosp Course


Assessment:


Choledocholithiasis


Cholelithiasis


Hypertension


Chronic pain syndrome


History of alcohol abuse





Plan:


ERCP possible sphincterotomy and stone removal.  The procedure was explained to 

the patient in detail including risks, benefits alternatives.  She is agreeable 

to proceed.


The recommendation will depend on findings.


Problems:  





Consultation Date/Type/Reason


Admit Date/Time


Nov 7, 2017 at 19:08


Date of Consultation:  Nov 8, 2017


Type of Consultation:  GI


Reason for Consultation


Choledocholithiasis





Hx of Present Illness


62-year-old female poor historian, hospitalized with complaints of epigastric 

right upper quadrant and also left upper quadrant pain.  The patient is noticed 

to have abnormal liver function tests and also cholelithiasis and dilated 

common bile duct with multitude of stones on CT.  The patient will be evaluated 

with ERCP.  The patient is an extremely poor historian there is no clear 

history of biliary pathology but she mentioned she has had problems with her 

pancreas and bile ducts.  ERCP was explained to the patient in detail including 

risks, benefits and alternatives.  Further recommendation will depend on 

findings.


Constitutional:  No chills, No febrile


Eyes:  No visual change


ENT:  No congestion, No pain


Respiratory:  No cough, No pain, No shortness of breath


Cardiovascular:  No chest pain, No lightheadedness, No palpitations


Gastrointestinal:  other (See HPI)


Genitourinary:  dysuria, hematuria


Musculoskeletal:  back pain


Skin:  No bruising, No erythema, No rash


Neurologic:  No dizziness, No focal-weakness, No headache


Psychological:  nl mood/affect, no complaints





Past Medical History


Medical History:  hypertension, other (Chronic back pain)





Past Surgical History


Past Surgical Hx:  other (Back surgery)





Family History


Significant Family History:  no pertinent family hx





Social History


Alcohol Use:  heavy


Smoking Status:  Smoker,current status unk


Drug Use:  none





Exam/Review of Systems


Vital Signs


Vitals





 Vital Signs








  Date Time  Temp Pulse Resp B/P Pulse Ox O2 Delivery O2 Flow Rate FiO2


 


11/8/17 14:00 98.0 89 20 188/100 98   


 


11/7/17 20:38      Room Air  














 Intake and Output   


 


 11/7/17 11/7/17 11/8/17





 14:59 22:59 06:59


 


Intake Total   630 ml


 


Balance   630 ml











Exam


PHYSICAL EXAMINATION:


GENERAL: Well developed, well nourished, alert & oriented x 3, in no acute 

distress


SKIN: No lesions, no stigmata chronic liver disease, no evidence of bleeding 

diathesis


LYMPHATIC: No palpable lymphadenopathy.


HEAD: Normocephalic, atraumatic, no tenderness.


EYES: Pupils equal reactive to light and accommodation, full extraocular 

movements, sclera clear, non-icteric, no discharge.


EARS/NOSE AND THROAT: Ears normal, nose normal, oropharynx normal, oral 

membranes well hydrated without lesions.


NECK: Supple, no masses, thyroid normal, JVP within normal limits, carotids 

normal without bruits.


CHEST: Inspection within normal limits.


CARDIOVASCULAR: Heart: Regular rate and rhythm, no murmurs, gallops or rubs. 

Peripheral pulses present within normal limits, no cyanosis, clubbing or 

edemas. No pulsatile abdominal mass


RESPIRATORY: Lungs clear to auscultation and percussion, no wheezing, no rubs


GASTROINTESTINAL AND LIVER: Abdomen: Soft, moderate epigastric and right upper 

quadrant tenderness, non-distended, no hernias, no masses, no organomegaly, no 

ascites, no guarding, no rebound tenderness, normoactive bowel sounds. Rectal: 

Deferred. 


GENITOURINARY: [Female genitalia within normal limits.]


EXTREMITIES: No cyanosis, clubbing or edema.


[MUSCULO-SKELETAL: Gait and station within normal limits, range of motion 

adequate.] 


[NEUROLOGIC: Cranial nerves II-XII intact, Motor within normal limits, Sensory 

within normal limits. Reflexes within normal limits.


PSYCHIATRIC: Alert & oriented x 3, mood/affect/judgement adequate]





Results


Result Diagram:  


11/7/17 1825 11/7/17 1825





Results 24 hrs





Laboratory Tests








Test


  11/8/17


00:40


 


Urine Color STRAW  


 


Urine Clarity CLEAR  


 


Urine pH 8.0  


 


Urine Specific Gravity 1.008  


 


Urine Ketones NEGATIVE  


 


Urine Nitrite NEGATIVE  


 


Urine Bilirubin NEGATIVE  


 


Urine Urobilinogen NEGATIVE  


 


Urine Leukocyte Esterase NEGATIVE  


 


Urine Microscopic RBC 3  


 


Urine Microscopic WBC 3  


 


Urine Bacteria FEW  A


 


Urine Hemoglobin NEGATIVE  


 


Urine Glucose NEGATIVE  


 


Urine Total Protein NEGATIVE  











Medications


Medications





 Current Medications


Dextrose/Sodium Chloride (D5-1/2ns) 1,000 ml @  100 mls/hr Q10H IV  Last 

administered on 11/8/17at 08:59; Admin Dose 100 MLS/HR;  Start 11/7/17 at 23:00


Ondansetron HCl (Zofran Inj) 4 mg Q6H  PRN IV NAUSEA AND/OR VOMITING Last 

administered on 11/8/17at 05:33; Admin Dose 4 MG;  Start 11/7/17 at 23:00


Ketorolac Tromethamine (Toradol) 30 mg Q6H  PRN IV PAIN;  Start 11/7/17 at 23:00

;  Stop 11/10/17 at 22:59


Hydromorphone HCl (Dilaudid) 0.5 mg Q4H  PRN IV PAIN Last administered on 11/8/ 17at 05:07; Admin Dose 0.5 MG;  Start 11/7/17 at 23:00


Hydralazine HCl (Apresoline) 10 mg Q4H  PRN IV ELEVATED SYSTOLIC BP Last 

administered on 11/8/17at 08:08; Admin Dose 10 MG;  Start 11/8/17 at 02:30


Amlodipine Besylate (Norvasc) 10 mg DAILY PO  Last administered on 11/8/17at 16:

50; Admin Dose 10 MG;  Start 11/8/17 at 15:30


Alprazolam (Xanax) 0.25 mg Q8H  PRN PO ANXIETY;  Start 11/8/17 at 15:30


Losartan Potassium 50 mg 50 mg DAILY PO  Last administered on 11/8/17at 16:49; 

Admin Dose 50 MG;  Start 11/8/17 at 15:30


Piperacillin Sod/ Tazobactam Sod (Zosyn 3.375gm/ 50 ml (Pmx)) 50 ml @  100 mls/

hr Q8 IVPB  Last administered on 11/8/17at 16:50; Admin Dose 100 MLS/HR;  Start 

11/8/17 at 15:30


Methadone HCl (Methadone) 60 mg DAILY PO  Last administered on 11/8/17at 16:50; 

Admin Dose 60 MG;  Start 11/8/17 at 15:30











YAO SORIA MD Nov 8, 2017 21:21

## 2017-11-08 NOTE — PN
Date/Time of Note


Date/Time of Note


DATE: 11/8/17 


TIME: 14:59





Assessment/Plan


VTE Prophylaxis


VTE Prophylaxis Intervention:  SCD's





Lines/Catheters


IV Catheter Type (from Nrsg):  Peripheral IV


Urinary Cath still in place:  No





Assessment/Plan


Assessment/Plan


1. Cholelithiasis and choledocholithiasis with mild obstructive biliary 

dilatation, GI Dr. Umana for ERCP and surgery for cholecystectomy after that


2. Hypertension, resume norvasc and losartan


3. Transaminitis, die to choledocholithiasis and fatty liver


4. Chronic pain on methadone





Subjective


24 Hr Interval Summary


Free Text/Dictation


abdominal pain. afebrile





Exam/Review of Systems


Vital Signs


Vitals





 Vital Signs








  Date Time  Temp Pulse Resp B/P Pulse Ox O2 Delivery O2 Flow Rate FiO2


 


11/8/17 07:20 98.3 80 20 196/94 97   


 


11/7/17 20:38      Room Air  














 Intake and Output   


 


 11/7/17 11/7/17 11/8/17





 15:00 23:00 07:00


 


Intake Total   630 ml


 


Balance   630 ml











Exam


Constitutional:  alert, oriented, well developed


Psych:  nl mood/affect, no complaints


Head:  atraumatic, normocephalic


Eyes:  EOMI, PERRL, nl conjunctiva, nl lids, nl sclera


ENMT:  nl external ears & nose, nl lips & teeth, nl nasal mucosa & septum


Neck:  non-tender, supple


Respiratory:  clear to auscultation, normal air movement, 


   No congested cough, No crackles/rales, No diminished breath sounds, No 

intercostal retraction, No labored breathing, No other, No respirations, No 

tactile fremitus, No wheezing


Cardiovascular:  nl pulses, regular rate and rhythm, 


   No S3, No S4, No bruits, No diastolic murmur, No edema, No gallop, No 

irregular rhythm, No jugular venous distention (JVD), No murmurs/extra sounds, 

No other, No rub, No systolic murmur


Gastrointestinal:  nl liver, spleen, non-tender, soft


Musculoskeletal:  nl extremities to inspection


Extremities:  normal pulses, 


   No calf tenderness, No clubbing, No cyanosis, No edema, No other, No 

palpable cord, No pitting pedal edema, No tenderness


Neurological:  CNS II-XII intact, nl mental status, nl speech, nl strength


Skin:  nl turgor





Results


Result Diagram:  


11/7/17 1825 11/7/17 1825





Results 24 hrs





Laboratory Tests








Test


  11/7/17


18:25 11/8/17


00:40


 


White Blood Count 9.2  # 


 


Red Blood Count 4.98   


 


Hemoglobin 15.7   


 


Hematocrit 46.0   


 


Mean Corpuscular Volume 92.4   


 


Mean Corpuscular Hemoglobin 31.5   


 


Mean Corpuscular Hemoglobin


Concent 34.1  


  


 


 


Red Cell Distribution Width 11.9   


 


Platelet Count 201   


 


Mean Platelet Volume 9.3   


 


Neutrophils % 79.2  H 


 


Lymphocytes % 12.5  L 


 


Monocytes % 7.4   


 


Eosinophils % 0.1   


 


Basophils % 0.4   


 


Nucleated Red Blood Cells % 0.0   


 


Neutrophils # 7.3   


 


Lymphocytes # 1.2   


 


Monocytes # 0.7   


 


Eosinophils # 0.0   


 


Basophils # 0.0   


 


Nucleated Red Blood Cells # 0.0   


 


Urine Color YELLOW   STRAW  


 


Urine Clarity


  SLIGHTLY


CLOUDY  A CLEAR  


 


 


Urine pH 8.0   8.0  


 


Urine Specific Gravity 1.013   1.008  


 


Urine Ketones 1+  H NEGATIVE  


 


Urine Nitrite NEGATIVE   NEGATIVE  


 


Urine Bilirubin NEGATIVE   NEGATIVE  


 


Urine Urobilinogen NEGATIVE   NEGATIVE  


 


Urine Leukocyte Esterase 1+  H NEGATIVE  


 


Urine Microscopic RBC 3   3  


 


Urine Microscopic WBC 5   3  


 


Urine Squamous Epithelial


Cells FEW  


  


 


 


Urine Bacteria FEW  A FEW  A


 


Urine Mucus FEW  A 


 


Urine Hemoglobin NEGATIVE   NEGATIVE  


 


Urine Glucose NEGATIVE   NEGATIVE  


 


Urine Total Protein NEGATIVE   NEGATIVE  


 


Sodium Level 141   


 


Potassium Level 4.0   


 


Chloride Level 100   


 


Carbon Dioxide Level 27   


 


Anion Gap 18  H 


 


Blood Urea Nitrogen 12   


 


Creatinine 0.69   


 


Glucose Level 122   


 


Calcium Level 8.9   


 


Total Bilirubin 0.6   


 


Direct Bilirubin 0.00   


 


Indirect Bilirubin 0.6   


 


Aspartate Amino Transf


(AST/SGOT) 83  H


  


 


 


Alanine Aminotransferase


(ALT/SGPT) 80  H


  


 


 


Alkaline Phosphatase 129  H 


 


Total Protein 8.3  H 


 


Albumin 4.3   


 


Globulin 4.00  H 


 


Albumin/Globulin Ratio 1.07   











Medications


Medications





 Current Medications


Dextrose/Sodium Chloride (D5-1/2ns) 1,000 ml @  100 mls/hr Q10H IV  Last 

administered on 11/8/17at 08:59; Admin Dose 100 MLS/HR;  Start 11/7/17 at 23:00


Ondansetron HCl (Zofran Inj) 4 mg Q6H  PRN IV NAUSEA AND/OR VOMITING Last 

administered on 11/8/17at 05:33; Admin Dose 4 MG;  Start 11/7/17 at 23:00


Lorazepam (Ativan) 1 mg Q4  PRN IV ANXIETY Last administered on 11/8/17at 12:29

; Admin Dose 1 MG;  Start 11/7/17 at 23:00


Morphine Sulfate (morphine) 4 mg Q4H  PRN IV PAIN Last administered on 11/8/ 17at 13:11; Admin Dose 4 MG;  Start 11/7/17 at 23:00


Ketorolac Tromethamine (Toradol) 30 mg Q6H  PRN IV PAIN;  Start 11/7/17 at 23:00

;  Stop 11/10/17 at 22:59


Hydromorphone HCl (Dilaudid) 0.5 mg Q4H  PRN IV PAIN Last administered on 11/8/ 17at 05:07; Admin Dose 0.5 MG;  Start 11/7/17 at 23:00


Hydralazine HCl (Apresoline) 10 mg Q4H  PRN IV ELEVATED SYSTOLIC BP Last 

administered on 11/8/17at 08:08; Admin Dose 10 MG;  Start 11/8/17 at 02:30











RITA DE LA CRUZ MD Nov 8, 2017 15:16

## 2017-11-08 NOTE — OPPN
Date/Time of Note


Date/Time of Note


DATE: 11/8/17 


TIME: 21:21





Proc Note GI


Procedure Date


11/8/17


Indication:  other (Choledocholithiasis)


Pre-procedure Diagnosis


Choledocholithiasis


Post-procedure Diagnosis


Impression:


Periampullary diverticulum


Probable previous sphincterotomy vs ampullary deformity due to stone passage


Significant dilatation of the biliary tree


More than 50 CBD stones sizes up to 2 cm


A few small stones removed


Post biliary stent placement 7 cm, Uzbek 10 stent





Plan:


Hold cholecystectomy unless evidence of acute cholecystitis until biliary tree 

cleared from stones


Patient will required additional endoscopic intervention with Spyglass 

lithotripsy and stone removal in the near future


Close observation


.


Procedure Performed:  ERCP (Plus stone removal and stent placement)


Surgeon


YAO SORIA MD


See signature line


Assistant


none


Anesthesia Type:  general


Anesthesiologist:  ALEX ROLAND MD


Tourniquet Time


none


EBL


   none


Transfusion required


   none


Biopsy 1:  None


Grafts/Implants


7 cm, Uzbek 10 biliary stent


Tubes/Drains


none


Complication(s)


none


Disposition:  PACU


Procedure Description


After informed consent, with the patient/relatives understanding the procedure, 

its indications, potential risks and complications, including but not limited to

: allergic reaction, bleeding, perforation or infection, and after all 

pertinent questions were answered to the patients satisfaction, the patient/

relatives signed witnessed informed consent. 


Following this, premedication was administered slowly IV push under careful 

cardiovascular and respiratory monitoring with pulse oximetry, automatic blood 

pressure, and EKG monitor. Once the sedative effect was achieved the patient 

was place in the prone position in the radiology special procedures suite; the 

side viewing panendoscope was introduced and advanced under visual control. 


Careful examination of the upper gastrointestinal tract, both on insertion as 

well as withdrawal of the instrument disclosed the following findings:


Esophagus: The mucosa of the entire appears within normal limits. There is no 

evidence of esophagitis, varices, neoplasm or stricture. No Hiatal Hernia 

identified. 


Stomach: Upon entrance to the stomach air was insufflated, the gastric walls 

distended normally, the mucosa of the fundus, body and antrum of the stomach 

was carefully examined both head-on and on retroflexion, and shows no 

abnormalities. There is no evidence of gastritis, ulcers, or neoplasm. 


Pylorus: The pylorus appears patent and within normal limits, with no evidence 

of gastric outlet obstruction. 


Duodenum: The duodenal mucosa was carefully examined in the duodenal bulb as 

well as the second portion of the duodenum and appears unremarkable with no 

evidence of duodenitis, ulcer or neoplasm. 


Ampulla of vater: There is a periampullary diverticulum.  The ampulla of Vater 

was identified and carefully examined appearing deformed with suggestion of 

previous sphincterotomy either endoscopic or traumatic due to stone passage. 


Cannulation: At this point cannulation was accomplished with the following 

fluoroscopic findings:


Pancreatogram: Avoided


Cholangiogram: Severe dilatation of the biliary tree with an estimated maximum 

diameter of 22 mm.  There are multitude of stones at least 50 ranging in size 

from 1-2 cm.  A few stone fragments were extracted.  Clearly clearance of the 

biliary tree will not be possible without lithotripsy.  At this point a Uzbek 

10, 7 cm plastic biliary stent was placed without difficulty and with excellent 

drainage.  The plan will be to come back in a short period of time and perform 

Spyglass lithotripsy and stone removal.





The instrument was then withdrawn the patient tolerated the procedure well and 

was transfer out of the endoscopy suite awake, and in good condition to 

continue to recover under observation.





Copies To:


CC:   YAO SORIA MD, MORDO MD Nov 8, 2017 21:29

## 2017-11-08 NOTE — CONS
Date/Time of Note


Date/Time of Note


DATE: 11/8/17 


TIME: 08:50





Assessment/Plan


Assessment/Plan


Chief Complaint/Hosp Course


1. Choledocholithiasis with pneumobilia: CT:   Severe extensive 

choledocholithiasis with mild obstructive biliary, dilatation minimal 

intrahepatic and extrahepatic pneumobilia; S/p ERCP W sphincterotomy


-npo


-gi consult


-ivf


-abx


-pain management


-eventual lap minerva


2.  Diffuse fatty infiltration of the liver


-diet and weight management


3. Abdominal pain: 2/2 #1


-pain management


4. Transaminitis: likely 2/2 #1, 2


-as above


5. Hypertension


-medical management-bp optimization


-weight management


6. Overweight: bmi 25


-diet and exercise optimization


-encourage weight loss





Thank you. Patient seen and examined in collaboration with Dr. Juancarlos Molina.


Problems:  





Consultation Date/Type/Reason


Admit Date/Time


Nov 7, 2017 at 20:08


Date of Consultation:  Nov 8, 2017


Type of Consultation:  SURGICAL


Reason for Consultation


Choledocholithiasis


Referring Provider:  NELIDA SALINAS DO





Hx of Present Illness


Viviane Kurtz is a 62-year-old woman who presented to the ED with 

complaints of dull left-sided flank pain x 2 days. The pain is described as 

sharp, worsened with movement, not associated with eating.  She reports having 

a history of gallstones, but has not had her gallbladder removed as she was 

asymptomatic. Associated symptoms include nausea with 1 time vomiting of 

nonbloody emesis. She also reports dysuria with foul-smelling, bloody urine. 

She denies fever, chest pain, shortness of breath, abdominal pain, diarrhea, 

hematochezia or melena, headache or dizziness. CT revealed choledocholithiasis. 

General surgery was asked to evaluate.


Constitutional:  No chills, No febrile


Eyes:  No visual change


ENT:  No congestion, No pain


Respiratory:  No cough, No pain, No shortness of breath


Cardiovascular:  No chest pain, No lightheadedness, No palpitations


Gastrointestinal:  other (as above)


Genitourinary:  dysuria, hematuria


Musculoskeletal:  back pain


Skin:  No bruising, No erythema, No rash


Neurologic:  No dizziness, No focal-weakness, No headache


Psychological:  anxiety





Past Medical History


Hypertension


cholelithiasis 


Overweight





Past Surgical History


back surgery





Family History


Significant Family History:  no pertinent family hx





Social History


Alcohol Use:  occasionally


Smoking Status:  Smoker,current status unk


Drug Use:  none





Exam/Review of Systems


Vital Signs


Vitals





 Vital Signs








  Date Time  Temp Pulse Resp B/P Pulse Ox O2 Delivery O2 Flow Rate FiO2


 


11/8/17 07:20 98.3 80 20 196/94 97   


 


11/7/17 20:38      Room Air  














 Intake and Output   


 


 11/7/17 11/7/17 11/8/17





 15:00 23:00 07:00


 


Intake Total   630 ml


 


Balance   630 ml











Exam


Constitutional:  alert, oriented, well developed


Psych:  anxiety


Head:  atraumatic, normocephalic


Eyes:  nl lids, nl sclera


ENMT:  mucosa pink and moist, nl nasal mucosa & septum


Neck:  non-tender, supple


Respiratory:  normal air movement, 


   No congested cough


Cardiovascular:  nl pulses, regular rate and rhythm


Gastrointestinal:  other (+murphys by palpation), soft, tender


Genitourinary - Female:  nl adnexae, nl external genitalia


Musculoskeletal:  nl extremities to inspection, nl gait and stance


Extremities:  normal pulses


Neurological:  nl mental status, nl speech, nl strength


Skin:  No rash or lesions


Lymph:  nl lymph nodes





Results


Result Diagram:  


11/7/17 1825 11/7/17 1825





Results 24 hrs





Laboratory Tests








Test


  11/7/17


18:25 11/8/17


00:40


 


White Blood Count 9.2  # 


 


Red Blood Count 4.98   


 


Hemoglobin 15.7   


 


Hematocrit 46.0   


 


Mean Corpuscular Volume 92.4   


 


Mean Corpuscular Hemoglobin 31.5   


 


Mean Corpuscular Hemoglobin


Concent 34.1  


  


 


 


Red Cell Distribution Width 11.9   


 


Platelet Count 201   


 


Mean Platelet Volume 9.3   


 


Neutrophils % 79.2  H 


 


Lymphocytes % 12.5  L 


 


Monocytes % 7.4   


 


Eosinophils % 0.1   


 


Basophils % 0.4   


 


Nucleated Red Blood Cells % 0.0   


 


Neutrophils # 7.3   


 


Lymphocytes # 1.2   


 


Monocytes # 0.7   


 


Eosinophils # 0.0   


 


Basophils # 0.0   


 


Nucleated Red Blood Cells # 0.0   


 


Urine Color YELLOW   STRAW  


 


Urine Clarity


  SLIGHTLY


CLOUDY  A CLEAR  


 


 


Urine pH 8.0   8.0  


 


Urine Specific Gravity 1.013   1.008  


 


Urine Ketones 1+  H NEGATIVE  


 


Urine Nitrite NEGATIVE   NEGATIVE  


 


Urine Bilirubin NEGATIVE   NEGATIVE  


 


Urine Urobilinogen NEGATIVE   NEGATIVE  


 


Urine Leukocyte Esterase 1+  H NEGATIVE  


 


Urine Microscopic RBC 3   3  


 


Urine Microscopic WBC 5   3  


 


Urine Squamous Epithelial


Cells FEW  


  


 


 


Urine Bacteria FEW  A FEW  A


 


Urine Mucus FEW  A 


 


Urine Hemoglobin NEGATIVE   NEGATIVE  


 


Urine Glucose NEGATIVE   NEGATIVE  


 


Urine Total Protein NEGATIVE   NEGATIVE  


 


Sodium Level 141   


 


Potassium Level 4.0   


 


Chloride Level 100   


 


Carbon Dioxide Level 27   


 


Anion Gap 18  H 


 


Blood Urea Nitrogen 12   


 


Creatinine 0.69   


 


Glucose Level 122   


 


Calcium Level 8.9   


 


Total Bilirubin 0.6   


 


Direct Bilirubin 0.00   


 


Indirect Bilirubin 0.6   


 


Aspartate Amino Transf


(AST/SGOT) 83  H


  


 


 


Alanine Aminotransferase


(ALT/SGPT) 80  H


  


 


 


Alkaline Phosphatase 129  H 


 


Total Protein 8.3  H 


 


Albumin 4.3   


 


Globulin 4.00  H 


 


Albumin/Globulin Ratio 1.07   











Medications


Medications





 Current Medications


Dextrose/Sodium Chloride (D5-1/2ns) 1,000 ml @  100 mls/hr Q10H IV  Last 

administered on 11/7/17at 23:24; Admin Dose 100 MLS/HR;  Start 11/7/17 at 23:00


Ondansetron HCl (Zofran Inj) 4 mg Q6H  PRN IV NAUSEA AND/OR VOMITING Last 

administered on 11/8/17at 05:33; Admin Dose 4 MG;  Start 11/7/17 at 23:00


Lorazepam (Ativan) 1 mg Q4  PRN IV ANXIETY;  Start 11/7/17 at 23:00


Morphine Sulfate (morphine) 4 mg Q4H  PRN IV PAIN Last administered on 11/7/ 17at 23:24; Admin Dose 4 MG;  Start 11/7/17 at 23:00


Ketorolac Tromethamine (Toradol) 30 mg Q6H  PRN IV PAIN;  Start 11/7/17 at 23:00

;  Stop 11/10/17 at 22:59


Hydromorphone HCl (Dilaudid) 0.5 mg Q4H  PRN IV PAIN Last administered on 11/8/ 17at 05:07; Admin Dose 0.5 MG;  Start 11/7/17 at 23:00


Hydralazine HCl (Apresoline) 10 mg Q4H  PRN IV ELEVATED SYSTOLIC BP Last 

administered on 11/8/17at 08:08; Admin Dose 10 MG;  Start 11/8/17 at 02:30











BA WREN NP Nov 8, 2017 09:03

## 2017-11-08 NOTE — HP
Date/Time of Note


Date/Time of Note


DATE: 11/8/17 


TIME: 09:03





Assessment/Plan


VTE Prophylaxis


VTE Prophylaxis Intervention:  SCD's





Lines/Catheters


IV Catheter Type (from Nrs):  Peripheral IV





Assessment/Plan


Assessment/Plan


ASSESSMENT


62-year-old female with a history of alcohol abuse, asthma, depression, chronic 

pain syndrome severe biliary and pancreatic obstruction per 2016 MRCP, 

presented with left flank pain 2 days is found to have severely extensive 

choledocholithiasis with mild obstructive biliary dilatation 





PLAN


Patient's left flank pain is probably musculoskeletal in origin.  UA does not 

appear to show UTI.  Will provide pain medication.  CT abdomen/pelvis is not 

really diagnostic of her left flank pain, however it showed severely extensive 

choledocholithiasis with mild obstructive biliary dilatation, worsening since 

2016.  Plan is to obtain MRCP.  Surgical and a GI consult has already been 

placed.





HPI/ROS


Admit Date/Time


Admit Date/Time


Nov 7, 2017 at 20:08





Hx of Present Illness





This is a 62-year-old female with a history of alcohol abuse, asthma, depression

, chronic pain syndrome severe biliary and pancreatic obstruction, concerning 

for neoplastic obstruction.  Patient presented to the ER complaining of left 

flank pain 2 days.  Pain was of sudden onset and its nonradiating.  Denied 

associated fever, chills or vomiting.  She also denied any trauma.  She said 

that she drinks a few cans of beer on a daily basis.  Patient was admitted here 

in 2016 for abdominal pain, at that time MRCP showed severe biliary and 

pancreatic obstruction, concerning for neoplastic obstruction.  She underwent 

ERCP which showed Markedly dilated common bile duct common hepatic duct and 

intrahepatic and probable papillary stenosis.  She did have a sphincterotomy.  

Unfortunately patient left AGAINST MEDICAL ADVICE before completion of workup.


When she presented to the ER, CT abdomen/pelvis showed severe extensive 

choledocholithiasis with mild obstructive biliary dilatation. The degree of 

biliary dilatation is actually improved when compared to the prior studies. The 

numerous stones within the duct are markedly worse when compared to the prior 

study from 2016.  Labs shows an AST of 83, ALT 80.  Urinalysis is not really 

consistent with UTI.





PMH/Family/Social


Past Surgical History


Past Surgical Hx:  no surgical history





Social History


Smoking Status:  Smoker,current status unk





Exam/Review of Systems


Vital Signs


Vitals





 Vital Signs








  Date Time  Temp Pulse Resp B/P Pulse Ox O2 Delivery O2 Flow Rate FiO2


 


11/8/17 07:20 98.3 80 20 196/94 97   


 


11/7/17 20:38      Room Air  














 Intake and Output   


 


 11/7/17 11/7/17 11/8/17





 15:00 23:00 07:00


 


Intake Total   630 ml


 


Balance   630 ml











Exam


Constitutional:  alert, oriented


Head:  atraumatic, normocephalic


Eyes:  EOMI, PERRL


Respiratory:  clear to auscultation, normal air movement


Gastrointestinal:  other (Significant left flank tenderness), soft


Extremities:  normal pulses





Labs


Result Diagram:  


11/7/17 1825 11/7/17 1825








Medications


Medications





 Current Medications


Dextrose/Sodium Chloride (D5-1/2ns) 1,000 ml @  100 mls/hr Q10H IV  Last 

administered on 11/8/17at 08:59; Admin Dose 100 MLS/HR;  Start 11/7/17 at 23:00


Ondansetron HCl (Zofran Inj) 4 mg Q6H  PRN IV NAUSEA AND/OR VOMITING Last 

administered on 11/8/17at 05:33; Admin Dose 4 MG;  Start 11/7/17 at 23:00


Lorazepam (Ativan) 1 mg Q4  PRN IV ANXIETY;  Start 11/7/17 at 23:00


Morphine Sulfate (morphine) 4 mg Q4H  PRN IV PAIN Last administered on 11/8/ 17at 08:59; Admin Dose 4 MG;  Start 11/7/17 at 23:00


Ketorolac Tromethamine (Toradol) 30 mg Q6H  PRN IV PAIN;  Start 11/7/17 at 23:00

;  Stop 11/10/17 at 22:59


Hydromorphone HCl (Dilaudid) 0.5 mg Q4H  PRN IV PAIN Last administered on 11/8/ 17at 05:07; Admin Dose 0.5 MG;  Start 11/7/17 at 23:00


Hydralazine HCl (Apresoline) 10 mg Q4H  PRN IV ELEVATED SYSTOLIC BP Last 

administered on 11/8/17at 08:08; Admin Dose 10 MG;  Start 11/8/17 at 02:30











MARVIN HUGGINS MD Nov 8, 2017 09:14

## 2017-11-09 VITALS — SYSTOLIC BLOOD PRESSURE: 150 MMHG | RESPIRATION RATE: 17 BRPM | DIASTOLIC BLOOD PRESSURE: 74 MMHG

## 2017-11-09 VITALS — SYSTOLIC BLOOD PRESSURE: 158 MMHG | RESPIRATION RATE: 19 BRPM | DIASTOLIC BLOOD PRESSURE: 87 MMHG

## 2017-11-09 VITALS — RESPIRATION RATE: 20 BRPM | SYSTOLIC BLOOD PRESSURE: 183 MMHG | DIASTOLIC BLOOD PRESSURE: 96 MMHG

## 2017-11-09 VITALS — DIASTOLIC BLOOD PRESSURE: 78 MMHG | RESPIRATION RATE: 18 BRPM | SYSTOLIC BLOOD PRESSURE: 137 MMHG

## 2017-11-09 VITALS — DIASTOLIC BLOOD PRESSURE: 79 MMHG | SYSTOLIC BLOOD PRESSURE: 120 MMHG | RESPIRATION RATE: 18 BRPM

## 2017-11-09 LAB
ALBUMIN SERPL-MCNC: 3.3 G/DL (ref 3.3–4.9)
ALBUMIN/GLOB SERPL: 1 {RATIO}
ALP SERPL-CCNC: 89 IU/L (ref 42–121)
ALT SERPL-CCNC: 65 IU/L (ref 13–69)
ANION GAP SERPL CALC-SCNC: 13 MMOL/L (ref 8–16)
AST SERPL-CCNC: 105 IU/L (ref 15–46)
BASOPHILS # BLD AUTO: 0 10^3/UL (ref 0–0.1)
BASOPHILS NFR BLD: 0.3 % (ref 0–2)
BILIRUB DIRECT SERPL-MCNC: 0 MG/DL (ref 0–0.2)
BILIRUB SERPL-MCNC: 0.9 MG/DL (ref 0.2–1.3)
BUN SERPL-MCNC: 15 MG/DL (ref 7–20)
CALCIUM SERPL-MCNC: 8.6 MG/DL (ref 8.4–10.2)
CHLORIDE SERPL-SCNC: 105 MMOL/L (ref 97–110)
CO2 SERPL-SCNC: 26 MMOL/L (ref 21–31)
CREAT SERPL-MCNC: 0.84 MG/DL (ref 0.44–1)
EOSINOPHIL # BLD: 0.1 10^3/UL (ref 0–0.5)
EOSINOPHIL NFR BLD: 1.3 % (ref 0–7)
ERYTHROCYTE [DISTWIDTH] IN BLOOD BY AUTOMATED COUNT: 12.1 % (ref 11.5–14.5)
GLOBULIN SER-MCNC: 3.3 G/DL (ref 1.3–3.2)
GLUCOSE SERPL-MCNC: 110 MG/DL (ref 70–220)
HCT VFR BLD CALC: 40.2 % (ref 37–47)
HGB BLD-MCNC: 13.6 G/DL (ref 12–16)
LYMPHOCYTES # BLD AUTO: 0.7 10^3/UL (ref 0.8–2.9)
LYMPHOCYTES NFR BLD AUTO: 9.1 % (ref 15–51)
MCH RBC QN AUTO: 32.2 PG (ref 29–33)
MCHC RBC AUTO-ENTMCNC: 33.8 G/DL (ref 32–37)
MCV RBC AUTO: 95.3 FL (ref 82–101)
MONOCYTES # BLD: 0.5 10^3/UL (ref 0.3–0.9)
MONOCYTES NFR BLD: 6.2 % (ref 0–11)
NEUTROPHILS # BLD: 6.5 10^3/UL (ref 1.6–7.5)
NEUTROPHILS NFR BLD AUTO: 83 % (ref 39–77)
NRBC # BLD MANUAL: 0 10^3/UL (ref 0–0)
NRBC BLD AUTO-RTO: 0 /100WBC (ref 0–0)
PLATELET # BLD: 158 10^3/UL (ref 140–415)
PMV BLD AUTO: 9.5 FL (ref 7.4–10.4)
POTASSIUM SERPL-SCNC: 3.3 MMOL/L (ref 3.5–5.1)
PROT SERPL-MCNC: 6.6 G/DL (ref 6.1–8.1)
RBC # BLD AUTO: 4.22 10^6/UL (ref 4.2–5.4)
SODIUM SERPL-SCNC: 141 MMOL/L (ref 135–144)
WBC # BLD AUTO: 7.8 10^3/UL (ref 4.8–10.8)

## 2017-11-09 RX ADMIN — FOLIC ACID SCH MLS/HR: 5 INJECTION, SOLUTION INTRAMUSCULAR; INTRAVENOUS; SUBCUTANEOUS at 01:16

## 2017-11-09 RX ADMIN — PIPERACILLIN AND TAZOBACTAM SCH MLS/HR: 3; .375 INJECTION, POWDER, LYOPHILIZED, FOR SOLUTION INTRAVENOUS; PARENTERAL at 14:00

## 2017-11-09 RX ADMIN — METHADONE HYDROCHLORIDE SCH MG: 10 TABLET ORAL at 08:06

## 2017-11-09 RX ADMIN — FOLIC ACID SCH MLS/HR: 5 INJECTION, SOLUTION INTRAMUSCULAR; INTRAVENOUS; SUBCUTANEOUS at 05:00

## 2017-11-09 RX ADMIN — PIPERACILLIN AND TAZOBACTAM SCH MLS/HR: 3; .375 INJECTION, POWDER, LYOPHILIZED, FOR SOLUTION INTRAVENOUS; PARENTERAL at 05:11

## 2017-11-09 RX ADMIN — HYDROMORPHONE HYDROCHLORIDE PRN MG: 1 INJECTION, SOLUTION INTRAMUSCULAR; INTRAVENOUS; SUBCUTANEOUS at 05:11

## 2017-11-09 RX ADMIN — FOLIC ACID SCH MLS/HR: 5 INJECTION, SOLUTION INTRAMUSCULAR; INTRAVENOUS; SUBCUTANEOUS at 16:22

## 2017-11-09 RX ADMIN — AMLODIPINE BESYLATE SCH MG: 10 TABLET ORAL at 08:06

## 2017-11-09 RX ADMIN — LOSARTAN POTASSIUM SCH MG: 50 TABLET, FILM COATED ORAL at 08:06

## 2017-11-09 RX ADMIN — PIPERACILLIN AND TAZOBACTAM SCH MLS/HR: 3; .375 INJECTION, POWDER, LYOPHILIZED, FOR SOLUTION INTRAVENOUS; PARENTERAL at 01:16

## 2017-11-09 RX ADMIN — ALPRAZOLAM PRN MG: 0.25 TABLET ORAL at 16:53

## 2017-11-09 RX ADMIN — PIPERACILLIN AND TAZOBACTAM SCH MLS/HR: 3; .375 INJECTION, POWDER, LYOPHILIZED, FOR SOLUTION INTRAVENOUS; PARENTERAL at 21:59

## 2017-11-09 RX ADMIN — SENNOSIDES SCH TAB: 8.6 TABLET, FILM COATED ORAL at 23:57

## 2017-11-09 NOTE — PN
Date/Time of Note


Date/Time of Note


DATE: 11/9/17 


TIME: 12:42





Assessment/Plan


VTE Prophylaxis


VTE Prophylaxis Intervention:  SCD's





Lines/Catheters


IV Catheter Type (from [facility]):  Peripheral IV


Urinary Cath still in place:  No





Assessment/Plan


Chief Complaint/Hosp Course


Assessment:


Choledocholithiasis


   ERCP 11/8/17


   Impression:


   Periampullary diverticulum


   Probable previous sphincterotomy vs ampullary deformity due to stone passage


   Significant dilatation of the biliary tree


   More than 50 CBD stones sizes up to 2 cm


   A few small stones removed


   Post biliary stent placement 7 cm, [place] 10 stent


Cholelithiasis


Hypertension


Chronic pain syndrome


History of alcohol abuse








Plan:


Hold cholecystectomy unless evidence of acute cholecystitis until biliary tree 

cleared from stones


Patient will required additional endoscopic intervention with Spyglass 

lithotripsy and stone removal in the near future


Continue close observation


Pain management 


Patient seen in collaboration with Dr. Umana





Subjective:


Course reviewed with nursing staff


Patient interviewed and examined


All labs, imaging and other results reviewed





The patient is feeling much better, still with some pain but has significantly 

improved, discussed results of ERCP and need for future ERCP with spyglass and 

lithotripsy in near future


advance diet to full and then as tolerated.








Constitutional:  No chills, No febrile


Eyes:  No visual change


ENT:  No congestion, No pain


Respiratory:  No cough, No pain, No shortness of breath


Cardiovascular:  No chest pain, No lightheadedness, No palpitations


Gastrointestinal:  other (See HPI)


Genitourinary:  dysuria, hematuria


Musculoskeletal:  back pain


Skin:  No bruising, No erythema, No rash


Neurologic:  No dizziness, No focal-weakness, No headache


Psychological:  nl mood/affect, no complaints


Problems:  





Exam/Review of Systems


Vital Signs


Vitals





 Vital Signs








  Date Time  Temp Pulse Resp B/P Pulse Ox O2 Delivery O2 Flow Rate FiO2


 


11/9/17 05:26 98.1 85 19 158/87 95   


 


11/8/17 22:04      Room Air  


 


11/8/17 21:34       8.0 














 Intake and Output   


 


 11/8/17 11/8/17 11/9/17





 15:00 23:00 07:00


 


Intake Total 400 ml 1050 ml 625 ml


 


Output Total  1200 ml 


 


Balance 400 ml -150 ml 625 ml











Results


Result Diagram:  


11/9/17 0438                                                                   

             11/9/17 0438





Results 24 hrs





Laboratory Tests








Test


  11/9/17


04:38


 


White Blood Count 7.8  


 


Red Blood Count 4.22  


 


Hemoglobin 13.6  


 


Hematocrit 40.2  


 


Mean Corpuscular Volume 95.3  


 


Mean Corpuscular Hemoglobin 32.2  


 


Mean Corpuscular Hemoglobin


Concent 33.8  


 


 


Red Cell Distribution Width 12.1  


 


Platelet Count 158  #


 


Mean Platelet Volume 9.5  


 


Neutrophils % 83.0  H


 


Lymphocytes % 9.1  L


 


Monocytes % 6.2  


 


Eosinophils % 1.3  


 


Basophils % 0.3  


 


Nucleated Red Blood Cells % 0.0  


 


Neutrophils # 6.5  


 


Lymphocytes # 0.7  L


 


Monocytes # 0.5  


 


Eosinophils # 0.1  


 


Basophils # 0.0  


 


Nucleated Red Blood Cells # 0.0  


 


Sodium Level 141  


 


Potassium Level 3.3  L


 


Chloride Level 105  


 


Carbon Dioxide Level 26  


 


Anion Gap 13  


 


Blood Urea Nitrogen 15  


 


Creatinine 0.84  


 


Glucose Level 110  


 


Calcium Level 8.6  


 


Total Bilirubin 0.9  


 


Direct Bilirubin 0.00  


 


Indirect Bilirubin 0.9  


 


Aspartate Amino Transf


(AST/SGOT) 105  H


 


 


Alanine Aminotransferase


(ALT/SGPT) 65  


 


 


Alkaline Phosphatase 89  


 


Total Protein 6.6  #


 


Albumin 3.3  #


 


Globulin 3.30  H


 


Albumin/Globulin Ratio 1.00  











Medications


Medications





 Current Medications


Dextrose/Sodium Chloride (D5-1/2ns) 1,000 ml @  100 mls/hr Q10H IV  Last 

administered on 11/9/17at 01:16; Admin Dose 100 MLS/HR;  Start 11/7/17 at 23:00


Ondansetron HCl (Zofran Inj) 4 mg Q6H  PRN IV NAUSEA AND/OR VOMITING Last 

administered on 11/8/17at 05:33; Admin Dose 4 MG;  Start 11/7/17 at 23:00


Ketorolac Tromethamine (Toradol) 30 mg Q6H  PRN IV PAIN;  Start 11/7/17 at 23:00

;  Stop 11/10/17 at 22:59


Hydromorphone HCl (Dilaudid) 0.5 mg Q4H  PRN IV PAIN Last administered on 11/9/ 17at 05:11; Admin Dose 0.5 MG;  Start 11/7/17 at 23:00


Hydralazine HCl (Apresoline) 10 mg Q4H  PRN IV ELEVATED SYSTOLIC BP Last 

administered on 11/8/17at 08:08; Admin Dose 10 MG;  Start 11/8/17 at 02:30


Amlodipine Besylate (Norvasc) 10 mg DAILY PO  Last administered on 11/9/17at 08:

06; Admin Dose 10 MG;  Start 11/8/17 at 15:30


Alprazolam (Xanax) 0.25 mg Q8H  PRN PO ANXIETY;  Start 11/8/17 at 15:30


Losartan Potassium 50 mg 50 mg DAILY PO  Last administered on 11/9/17at 08:06; 

Admin Dose 50 MG;  Start 11/8/17 at 15:30


Piperacillin Sod/ Tazobactam Sod (Zosyn 3.375gm/ 50 ml (Pmx)) 50 ml @  100 mls/

hr Q8 IVPB  Last administered on 11/9/17at 05:11; Admin Dose 100 MLS/HR;  Start 

11/8/17 at 15:30


Methadone HCl (Methadone) 60 mg DAILY PO  Last administered on 11/9/17at 08:06; 

Admin Dose 60 MG;  Start 11/8/17 at 15:30


Lorazepam (Ativan) 1 mg Q3H  PRN IM AGITATION;  Start 11/8/17 at 23:00











DANK NEW Nov 9, 2017 12:45

## 2017-11-09 NOTE — PN
Date/Time of Note


Date/Time of Note


DATE: 11/9/17 


TIME: 11:57





Assessment/Plan


Lines/Catheters


IV Catheter Type (from [facility]):  Peripheral IV


Miner in Place (from [facility]):  No





Assessment/Plan


Chief Complaint/Hosp Course


1. Choledocholithiasis with pneumobilia: CT:   Severe extensive 

choledocholithiasis with mild obstructive biliary, dilatation minimal 

intrahepatic and extrahepatic pneumobilia; S/p ERCP w stent and stone removal; 

no minerva at this point per gi


-ivf


-abx


-pain management


-eventual lap minerva w stent removal after stones are cleared and per gi


2.  Diffuse fatty infiltration of the liver


-diet and weight management


3. Abdominal pain: 2/2 #1: improved


-pain management


4. Transaminitis: likely 2/2 #1, 2


-as above


5. Hypertension


-medical management-bp optimization


-weight management


6. Overweight: bmi 25


-diet and exercise optimization


-encourage weight loss





Thank you. Patient seen and examined in collaboration with Dr. Juancarlos Molina.


Problems:  





Subjective


24 Hr Interval Summary


Feels well. S/p ERCP, stone removal and stent placement. No fevers, chills, sob

, congested cough, cp, palpitations, ha, dizziness, n/v/d/dysuria.





Exam/Review of Systems


Vital Signs


Vitals





 Vital Signs








  Date Time  Temp Pulse Resp B/P Pulse Ox O2 Delivery O2 Flow Rate FiO2


 


11/10/17 09:56    154/81    


 


11/10/17 09:35  80 18  94   


 


11/10/17 07:50 98.6       


 


11/8/17 22:04      Room Air  


 


11/8/17 21:34       8.0 














 Intake and Output   


 


 11/9/17 11/9/17 11/10/17





 14:59 22:59 06:59


 


Intake Total  1280 ml 580 ml


 


Output Total   1100 ml


 


Balance  1280 ml -520 ml











Exam


Free Text/Dictation


Constitutional:  alert, oriented, well developed


Psych:  anxiety


Head:  atraumatic, normocephalic


Eyes:  nl lids, nl sclera


ENMT:  mucosa pink and moist, nl nasal mucosa & septum


Neck:  non-tender, supple


Respiratory:  normal air movement, 


   No congested cough


Cardiovascular:  nl pulses, regular rate and rhythm


Gastrointestinal:  soft, mod tender


Genitourinary - Female:  nl adnexae, nl external genitalia


Musculoskeletal:  nl extremities to inspection, nl gait and stance


Extremities:  normal pulses


Neurological:  nl mental status, nl speech, nl strength


Skin:  No rash or lesions


Lymph:  nl lymph nodes





Results


Result Diagram:  


11/9/17 0438                                                                   

             11/9/17 0438














BA WREN NP Nov 9, 2017 12:04

## 2017-11-09 NOTE — RADRPT
PROCEDURE:  Fluoroscopy services.

 

CLINICAL INDICATION:   Choledocholithiasis.

 

TECHNIQUE:  Fluoroscopy services ERCP and placement of biliary stent. 

 

COMPARISON:  CT examination the abdomen and pelvis dated 11/07/2017.

 

FINDINGS:

Fluoroscopy services during ERCP and placement of a biliary stent. 2 intraoperative spot films were 
obtained at intermediate stages during this procedure and demonstrate instrumentation over the right
 upper quadrant with wire cannulization of the common bile duct. The common bile duct is opacified. 
A biliary stent is placed. Extensive choledocholithiasis is seen within the dilated common bile duct
.

 

68.6 seconds of fluoroscopy time were employed during this procedure.

 

IMPRESSION:

Fluoroscopy services during ERCP and placement of a biliary stent.

 

RPTAT: UU

_____________________________________________ 

Physician Ivett           Date    Time 

Electronically viewed and signed by Physician Ivett on 11/09/2017 18:11 

 

D:  11/09/2017 18:11  T:  11/09/2017 18:11

RS/

## 2017-11-09 NOTE — RADRPT
PROCEDURE:   MRCP. 

 

CLINICAL INDICATION:   Dilated common bile duct, pain. 

 

TECHNIQUE:   MRCP was performed.  Patient was examined without contrast.  3-D coronal rotating MIP i
mages of the biliary tree are available for review. 

 

COMPARISON:   CT, 11/07/2017  

 

FINDINGS:

 

The gallbladder is distended and contains multiple small gallstones. No gallbladder wall thickening 
or pericholecystic fluid is identified. Common bile duct is significantly distended, measuring 20 mm
 in maximal diameter. There is mild to moderate dilatation of intrahepatic biliary ducts as well. Nu
merous filling defects are present within the common bile duct and common hepatic duct, measuring up
 to approximately 15 mm. Pneumobilia is identified, presumably secondary to recent ERCP. There is di
ffuse dilatation of the main pancreatic duct - maximal diameter is 8 mm.

 

The liver is grossly normal in size and signal intensity, without evidence of focal mass. Pancreas i
s mildly atrophic, without evidence of focal mass or peripancreatic inflammation. Splenomegaly is no
saritha measuring 15 cm. Adrenal glands and kidneys are unremarkable. There is no obstructive uropathy. 
The stomach is grossly unremarkable. 

 

Abdominal aorta is normal in caliber. There is no retroperitoneal or luan hepatis lymphadenopathy. 
No bowel obstruction, abscess or ascites is identified. The patient is status post thoracolumbar spi
nal fusion. There is degenerative enthesopathy of the spine. No focal osseous lesion is seen.

 

IMPRESSION:

 

1.  There is significant dilatation of the common bile duct, measuring approximately 20 mm in maxima
l diameter. Mild to moderate intrahepatic biliary dilatation is seen as well. Numerous stones are pr
esent within the common hepatic and common bile ducts, measuring up to approximately 15 mm.

2.  Gallbladder distension of cholelithiasis are noted, without evidence for cholecystitis.

3.  There is significant dilatation of the main pancreatic duct as well.

4.  Splenomegaly is noted.

5.  No gross evidence of mass or lymphadenopathy is seen.

 

RPTAT: AAOO

_____________________________________________ 

.Isak Craig MD, MD           Date    Time 

Electronically viewed and signed by .Isak Craig MD, MD on 11/09/2017 10:41 

 

D:  11/09/2017 10:41  T:  11/09/2017 10:41

.R/

## 2017-11-09 NOTE — PN
Date/Time of Note


Date/Time of Note


DATE: 11/9/17 


TIME: 14:18





Assessment/Plan


VTE Prophylaxis


VTE Prophylaxis Intervention:  SCD's





Lines/Catheters


IV Catheter Type (from Nrs):  Peripheral IV


Urinary Cath still in place:  No





Assessment/Plan


Assessment/Plan


1. Cholelithiasis and choledocholithiasis with mild obstructive biliary 

dilatation, s/p ERCP with CBD stent 11/8/2017, follow up with GI for further 

treatment(need to repeat ERCP)


2. Hypertension, resume norvasc and losartan


3. Transaminitis, die to choledocholithiasis and fatty liver


4. Chronic pain on methadone





Subjective


24 Hr Interval Summary


Free Text/Dictation


afebrile, no abdominal pain





Exam/Review of Systems


Vital Signs


Vitals





 Vital Signs








  Date Time  Temp Pulse Resp B/P Pulse Ox O2 Delivery O2 Flow Rate FiO2


 


11/9/17 14:00 97.8 79 18 120/79 95   


 


11/8/17 22:04      Room Air  


 


11/8/17 21:34       8.0 














 Intake and Output   


 


 11/8/17 11/8/17 11/9/17





 15:00 23:00 07:00


 


Intake Total 400 ml 1050 ml 625 ml


 


Output Total  1200 ml 


 


Balance 400 ml -150 ml 625 ml











Exam


Constitutional:  alert, oriented, well developed


Head:  atraumatic, normocephalic


Eyes:  EOMI, PERRL, nl conjunctiva, nl lids, nl sclera


ENMT:  nl external ears & nose, nl lips & teeth, nl nasal mucosa & septum


Neck:  non-tender, supple


Respiratory:  clear to auscultation, normal air movement, 


   No congested cough, No crackles/rales, No diminished breath sounds, No 

intercostal retraction, No labored breathing, No other, No respirations, No 

tactile fremitus, No wheezing


Cardiovascular:  nl pulses, regular rate and rhythm, 


   No S3, No S4, No bruits, No diastolic murmur, No edema, No gallop, No 

irregular rhythm, No jugular venous distention (JVD), No murmurs/extra sounds, 

No other, No rub, No systolic murmur


Gastrointestinal:  nl liver, spleen, soft


Musculoskeletal:  nl extremities to inspection


Extremities:  normal pulses, 


   No calf tenderness, No clubbing, No cyanosis, No edema, No other, No 

palpable cord, No pitting pedal edema, No tenderness


Neurological:  CNS II-XII intact, nl mental status, nl speech, nl strength


Skin:  nl turgor


Lymph:  nl lymph nodes





Results


Result Diagram:  


11/9/17 0438 11/9/17 0438





Results 24 hrs





Laboratory Tests








Test


  11/9/17


04:38


 


White Blood Count 7.8  


 


Red Blood Count 4.22  


 


Hemoglobin 13.6  


 


Hematocrit 40.2  


 


Mean Corpuscular Volume 95.3  


 


Mean Corpuscular Hemoglobin 32.2  


 


Mean Corpuscular Hemoglobin


Concent 33.8  


 


 


Red Cell Distribution Width 12.1  


 


Platelet Count 158  #


 


Mean Platelet Volume 9.5  


 


Neutrophils % 83.0  H


 


Lymphocytes % 9.1  L


 


Monocytes % 6.2  


 


Eosinophils % 1.3  


 


Basophils % 0.3  


 


Nucleated Red Blood Cells % 0.0  


 


Neutrophils # 6.5  


 


Lymphocytes # 0.7  L


 


Monocytes # 0.5  


 


Eosinophils # 0.1  


 


Basophils # 0.0  


 


Nucleated Red Blood Cells # 0.0  


 


Sodium Level 141  


 


Potassium Level 3.3  L


 


Chloride Level 105  


 


Carbon Dioxide Level 26  


 


Anion Gap 13  


 


Blood Urea Nitrogen 15  


 


Creatinine 0.84  


 


Glucose Level 110  


 


Calcium Level 8.6  


 


Total Bilirubin 0.9  


 


Direct Bilirubin 0.00  


 


Indirect Bilirubin 0.9  


 


Aspartate Amino Transf


(AST/SGOT) 105  H


 


 


Alanine Aminotransferase


(ALT/SGPT) 65  


 


 


Alkaline Phosphatase 89  


 


Total Protein 6.6  #


 


Albumin 3.3  #


 


Globulin 3.30  H


 


Albumin/Globulin Ratio 1.00  











Medications


Medications





 Current Medications


Dextrose/Sodium Chloride (D5-1/2ns) 1,000 ml @  100 mls/hr Q10H IV  Last 

administered on 11/9/17at 01:16; Admin Dose 100 MLS/HR;  Start 11/7/17 at 23:00


Ondansetron HCl (Zofran Inj) 4 mg Q6H  PRN IV NAUSEA AND/OR VOMITING Last 

administered on 11/8/17at 05:33; Admin Dose 4 MG;  Start 11/7/17 at 23:00


Ketorolac Tromethamine (Toradol) 30 mg Q6H  PRN IV PAIN;  Start 11/7/17 at 23:00

;  Stop 11/10/17 at 22:59


Hydromorphone HCl (Dilaudid) 0.5 mg Q4H  PRN IV PAIN Last administered on 11/9/ 17at 05:11; Admin Dose 0.5 MG;  Start 11/7/17 at 23:00


Hydralazine HCl (Apresoline) 10 mg Q4H  PRN IV ELEVATED SYSTOLIC BP Last 

administered on 11/8/17at 08:08; Admin Dose 10 MG;  Start 11/8/17 at 02:30


Amlodipine Besylate (Norvasc) 10 mg DAILY PO  Last administered on 11/9/17at 08:

06; Admin Dose 10 MG;  Start 11/8/17 at 15:30


Alprazolam (Xanax) 0.25 mg Q8H  PRN PO ANXIETY;  Start 11/8/17 at 15:30


Losartan Potassium 50 mg 50 mg DAILY PO  Last administered on 11/9/17at 08:06; 

Admin Dose 50 MG;  Start 11/8/17 at 15:30


Piperacillin Sod/ Tazobactam Sod (Zosyn 3.375gm/ 50 ml (Pmx)) 50 ml @  100 mls/

hr Q8 IVPB  Last administered on 11/9/17at 05:11; Admin Dose 100 MLS/HR;  Start 

11/8/17 at 15:30


Methadone HCl (Methadone) 60 mg DAILY PO  Last administered on 11/9/17at 08:06; 

Admin Dose 60 MG;  Start 11/8/17 at 15:30


Lorazepam (Ativan) 1 mg Q3H  PRN IM AGITATION;  Start 11/8/17 at 23:00











RITA DE LA CRUZ MD Nov 9, 2017 14:21

## 2017-11-10 VITALS — SYSTOLIC BLOOD PRESSURE: 143 MMHG | RESPIRATION RATE: 20 BRPM | DIASTOLIC BLOOD PRESSURE: 81 MMHG

## 2017-11-10 VITALS — RESPIRATION RATE: 20 BRPM | DIASTOLIC BLOOD PRESSURE: 94 MMHG | SYSTOLIC BLOOD PRESSURE: 172 MMHG

## 2017-11-10 VITALS — SYSTOLIC BLOOD PRESSURE: 154 MMHG | DIASTOLIC BLOOD PRESSURE: 81 MMHG

## 2017-11-10 VITALS — RESPIRATION RATE: 18 BRPM | DIASTOLIC BLOOD PRESSURE: 84 MMHG | SYSTOLIC BLOOD PRESSURE: 158 MMHG

## 2017-11-10 VITALS — DIASTOLIC BLOOD PRESSURE: 81 MMHG | SYSTOLIC BLOOD PRESSURE: 154 MMHG | RESPIRATION RATE: 18 BRPM

## 2017-11-10 VITALS — DIASTOLIC BLOOD PRESSURE: 94 MMHG | RESPIRATION RATE: 20 BRPM | SYSTOLIC BLOOD PRESSURE: 176 MMHG

## 2017-11-10 RX ADMIN — ALPRAZOLAM PRN MG: 0.25 TABLET ORAL at 04:09

## 2017-11-10 RX ADMIN — SENNOSIDES SCH TAB: 8.6 TABLET, FILM COATED ORAL at 08:04

## 2017-11-10 RX ADMIN — METHADONE HYDROCHLORIDE SCH MG: 10 TABLET ORAL at 08:05

## 2017-11-10 RX ADMIN — AMLODIPINE BESYLATE SCH MG: 10 TABLET ORAL at 08:04

## 2017-11-10 RX ADMIN — FOLIC ACID SCH MLS/HR: 5 INJECTION, SOLUTION INTRAMUSCULAR; INTRAVENOUS; SUBCUTANEOUS at 18:55

## 2017-11-10 RX ADMIN — PIPERACILLIN AND TAZOBACTAM SCH MLS/HR: 3; .375 INJECTION, POWDER, LYOPHILIZED, FOR SOLUTION INTRAVENOUS; PARENTERAL at 06:00

## 2017-11-10 RX ADMIN — PIPERACILLIN AND TAZOBACTAM SCH MLS/HR: 3; .375 INJECTION, POWDER, LYOPHILIZED, FOR SOLUTION INTRAVENOUS; PARENTERAL at 14:34

## 2017-11-10 RX ADMIN — SENNOSIDES SCH TAB: 8.6 TABLET, FILM COATED ORAL at 21:05

## 2017-11-10 RX ADMIN — FOLIC ACID SCH MLS/HR: 5 INJECTION, SOLUTION INTRAMUSCULAR; INTRAVENOUS; SUBCUTANEOUS at 11:00

## 2017-11-10 RX ADMIN — PIPERACILLIN AND TAZOBACTAM SCH MLS/HR: 3; .375 INJECTION, POWDER, LYOPHILIZED, FOR SOLUTION INTRAVENOUS; PARENTERAL at 21:05

## 2017-11-10 RX ADMIN — LOSARTAN POTASSIUM SCH MG: 50 TABLET, FILM COATED ORAL at 08:05

## 2017-11-10 RX ADMIN — FOLIC ACID SCH MLS/HR: 5 INJECTION, SOLUTION INTRAMUSCULAR; INTRAVENOUS; SUBCUTANEOUS at 01:00

## 2017-11-10 RX ADMIN — HYDROMORPHONE HYDROCHLORIDE PRN MG: 1 INJECTION, SOLUTION INTRAMUSCULAR; INTRAVENOUS; SUBCUTANEOUS at 17:40

## 2017-11-10 RX ADMIN — HYDROMORPHONE HYDROCHLORIDE PRN MG: 1 INJECTION, SOLUTION INTRAMUSCULAR; INTRAVENOUS; SUBCUTANEOUS at 05:29

## 2017-11-10 RX ADMIN — HYDROMORPHONE HYDROCHLORIDE PRN MG: 1 INJECTION, SOLUTION INTRAMUSCULAR; INTRAVENOUS; SUBCUTANEOUS at 09:55

## 2017-11-10 RX ADMIN — FOLIC ACID SCH MLS/HR: 5 INJECTION, SOLUTION INTRAMUSCULAR; INTRAVENOUS; SUBCUTANEOUS at 20:11

## 2017-11-10 RX ADMIN — HYDROMORPHONE HYDROCHLORIDE PRN MG: 1 INJECTION, SOLUTION INTRAMUSCULAR; INTRAVENOUS; SUBCUTANEOUS at 23:36

## 2017-11-10 NOTE — DS
Date/Time of Note


Date/Time of Note


DATE: 11/10/17 


TIME: 15:46





Discharge Summary


Admission/Discharge Info


Admit Date/Time


Nov 7, 2017 at 20:08


Discharge Date/Time





Discharge Diagnosis


1. Cholelithiasis and choledocholithiasis with mild obstructive biliary 

dilatation, s/p ERCP with CBD stent 11/8/2017, follow up with Dr. Umana or 

further treatment(need to repeat ERCP)


2. Hypertension, antihypertensives


3. Transaminitis, due to choledocholithiasis and fatty liver


4. Chronic pain on methadone


Patient Condition:  Stable


Hx of Present Illness





Hospital Course





This is a 62-year-old female with a history of alcohol abuse, asthma, depression

, chronic pain syndrome severe biliary and pancreatic obstruction, concerning 

for neoplastic obstruction.  Patient presented to the ER complaining of left 

flank pain 2 days.  Pain was of sudden onset and its nonradiating.  Denied 

associated fever, chills or vomiting.  She also denied any trauma.  She said 

that she drinks a few cans of beer on a daily basis.  Patient was admitted here 

in 2016 for abdominal pain, at that time MRCP showed severe biliary and 

pancreatic obstruction, concerning for neoplastic obstruction.  She underwent 

ERCP which showed Markedly dilated common bile duct common hepatic duct and 

intrahepatic and probable papillary stenosis.  She did have a sphincterotomy.  

Unfortunately patient left AGAINST MEDICAL ADVICE before completion of workup.


When she presented to the ER, CT abdomen/pelvis showed severe extensive 

choledocholithiasis with mild obstructive biliary dilatation. The degree of 

biliary dilatation is actually improved when compared to the prior studies. The 

numerous stones within the duct are markedly worse when compared to the prior 

study from 2016.  Labs shows an AST of 83, ALT 80.  Urinalysis is not really 

consistent with UTI.





MRCP revealed significant dilatation of the common bile duct, measuring 

approximately 20 mm in maximal diameter. Mild to moderate intrahepatic biliary 

dilatation is seen as well. Numerous stones are present within the common 

hepatic and common bile ducts, measuring up to approximately 15 mm. Gallbladder 

distension of cholelithiasis are noted, without evidence for cholecystitis. 

There is significant dilatation of the main pancreatic duct as well. 

Splenomegaly is noted.





Patient got ERCP with CBD stent on 11/8/2017 without complication. Patient is 

afebrile, tolerates diet. She will need follow up with Dr. Umana to get  

additional endoscopic intervention with Spyglass lithotripsy and stone removal 

in the near future. Patient is aware of this. Cholecystectomy will be scheduled 

after the CBD stones are out and taken care of.


Home Meds


Active Scripts


Methadone Hcl* (Methadone*) 10 Mg Tab, 60 MG PO DAILY for 2 Days, TAB


   Prov:RITA DE LA CRUZ MD         11/10/17


Sennosides* (Senokot*) 8.6 Mg Tablet, 2 TAB PO BID for 30 Days, TAB


   Prov:LACEYMYRA V. NP         5/31/16


Multivitamins* (Theragran*) 1 Tab Tab, 1 TAB PO DAILY for 30 Days, TAB


   Prov:LACEYSHANICEA V. NP         5/31/16


Ibuprofen* (Ibuprofen*) 400 Mg Tablet, 400 MG PO Q6H for 30 Days, TAB


   Prov:LACEYMYRA. NP         5/31/16


Reported Medications


Losartan Potassium* (Losartan Potassium*) 50 Mg Tablet, 50 MG PO DAILY, TAB


   11/7/17


Amlodipine Besylate* (Amlodipine Besylate*) 10 Mg Tablet, 10 MG PO DAILY, #30 

TAB


   11/7/17


Clonazepam* (Clonazepam*) 2 Mg Tablet, 2 MG PO BID, TAB


   11/7/17


Discontinued Reported Medications


Methadone Hcl* (Methadone*) 10 Mg Tab, 60 MG PO DAILY, TAB


   6/8/16


Discontinued Scripts


Amlodipine Besylate* (Amlodipine Besylate*) 5 Mg Tablet, 5 MG PO BID for 30 Days

, TAB


   Prov:LACEYMYRA. NP         5/31/16


Bisacodyl* (Bisacodyl*) 5 Mg Tablet.dr, 5 MG PO DAILY Y for CONSTIPATION for 30 

Days


   Prov:MYRA LACEY. NP         5/31/16


Methadone Hcl* (Methadone*) 10 Mg Tab, 60 MG PO DAILY for 30 Days, TAB


   Prov:LACEY,MYRA V. NP         5/31/16


Hydralazine Hcl* (Apresoline*) 10 Mg Tab, 10 MG PO Q8 for 30 Days, TAB


   Prov:LACEYMYRA V. NP         5/31/16


Acetaminophen* (Acephen*) 650 Mg Supp, 650 MG ME Q6H Y for PAIN LEVEL 1-3 OR 

FEVER for 30 Days, SUPP


   Prov:MYRA LACEY. NP         5/31/16


Follow-up Plan


Dr. Umana in one week


PCP in one week


Primary Care Provider


RITA Etienne MD Nov 10, 2017 15:52

## 2017-11-10 NOTE — CONS
Date/Time of Note


Date/Time of Note


DATE: 11/10/17 


TIME: 11:56





Assessment/Plan


Assessment/Plan


Chief Complaint/Hosp Course


Assessment:


Choledocholithiasis


   ERCP 11/8/17


   Impression:


   Periampullary diverticulum


   Probable previous sphincterotomy vs ampullary deformity due to stone passage


   Significant dilatation of the biliary tree


   More than 50 CBD stones sizes up to 2 cm


   A few small stones removed


   Post biliary stent placement 7 cm, [place] 10 stent


Cholelithiasis


Hypertension


Chronic pain syndrome


History of alcohol abuse





Plan:


Hold cholecystectomy unless evidence of acute cholecystitis until biliary tree 

cleared from stones


Patient will required additional endoscopic intervention with Spyglass 

lithotripsy and stone removal in the near future, can be done as outpatient 


Continue close observation


Pain management 


if patient tolerates po and no other acute hospitalization issue, pt can be 

discharged to f/u with Dr. Umana for Spyglass lithotripsy


Problems:  





Consultation Date/Type/Reason


Admit Date/Time


Nov 7, 2017 at 20:08


Initial Consult Date


11/8/17


Type of Consultation:  GI


Referring Provider:  NELIDA SALINAS DO





24 HR Interval Summary


Free Text/Dictation


abdominal pain improved, no n/v, tolerating po


Constitutional:  improved





Exam/Review of Systems


Vital Signs


Vitals





 Vital Signs








  Date Time  Temp Pulse Resp B/P Pulse Ox O2 Delivery O2 Flow Rate FiO2


 


11/10/17 09:56    154/81    


 


11/10/17 09:35  80 18  94   


 


11/10/17 07:50 98.6       


 


11/8/17 22:04      Room Air  


 


11/8/17 21:34       8.0 














 Intake and Output   


 


 11/9/17 11/9/17 11/10/17





 15:00 23:00 07:00


 


Intake Total  1280 ml 580 ml


 


Output Total   1100 ml


 


Balance  1280 ml -520 ml











Exam


Constitutional:  alert, oriented, well developed


Psych:  nl mood/affect, no complaints


Head:  atraumatic, normocephalic


Eyes:  EOMI, nl conjunctiva, nl lids


ENMT:  nl external ears & nose, nl lips & teeth, nl nasal mucosa & septum


Neck:  non-tender, supple


Respiratory:  clear to auscultation, normal air movement


Cardiovascular:  nl pulses, regular rate and rhythm


Gastrointestinal:  bowel sounds, soft, tender (epigastric and RUQ)





Results


Result Diagram:  


11/9/17 0438 11/9/17 0438








Medications


Medications





 Current Medications


Dextrose/Sodium Chloride (D5-1/2ns) 1,000 ml @  100 mls/hr Q10H IV  Last 

administered on 11/9/17at 01:16; Admin Dose 100 MLS/HR;  Start 11/7/17 at 23:00


Ondansetron HCl (Zofran Inj) 4 mg Q6H  PRN IV NAUSEA AND/OR VOMITING Last 

administered on 11/8/17at 05:33; Admin Dose 4 MG;  Start 11/7/17 at 23:00


Ketorolac Tromethamine (Toradol) 30 mg Q6H  PRN IV PAIN;  Start 11/7/17 at 23:00

;  Stop 11/10/17 at 22:59


Hydromorphone HCl (Dilaudid) 0.5 mg Q4H  PRN IV PAIN Last administered on 11/10/

17at 09:55; Admin Dose 0.5 MG;  Start 11/7/17 at 23:00


Hydralazine HCl (Apresoline) 10 mg Q4H  PRN IV ELEVATED SYSTOLIC BP Last 

administered on 11/8/17at 08:08; Admin Dose 10 MG;  Start 11/8/17 at 02:30


Amlodipine Besylate (Norvasc) 10 mg DAILY PO  Last administered on 11/10/17at 08

:04; Admin Dose 10 MG;  Start 11/8/17 at 15:30


Alprazolam (Xanax) 0.25 mg Q8H  PRN PO ANXIETY Last administered on 11/10/17at 

04:09; Admin Dose 0.25 MG;  Start 11/8/17 at 15:30


Losartan Potassium 50 mg 50 mg DAILY PO  Last administered on 11/10/17at 08:05; 

Admin Dose 50 MG;  Start 11/8/17 at 15:30


Piperacillin Sod/ Tazobactam Sod (Zosyn 3.375gm/ 50 ml (Pmx)) 50 ml @  100 mls/

hr Q8 IVPB  Last administered on 11/9/17at 05:11; Admin Dose 100 MLS/HR;  Start 

11/8/17 at 15:30


Methadone HCl (Methadone) 60 mg DAILY PO  Last administered on 11/10/17at 08:05

; Admin Dose 60 MG;  Start 11/8/17 at 15:30


Lorazepam (Ativan) 1 mg Q3H  PRN IM AGITATION;  Start 11/8/17 at 23:00


Acetaminophen (Tylenol Tab) 650 mg Q6H  PRN PO PAIN AND OR ELEVATED TEMP;  

Start 11/10/17 at 00:00


Senna (Senokot) 2 tab BID PO  Last administered on 11/10/17at 08:04; Admin Dose 

2 TAB;  Start 11/10/17 at 00:00


Magnesium Hydroxide (Milk Of Mag) 30 ml BID  PRN PO CONSTIPATION;  Start 11/10/

17 at 00:00


Tramadol HCl (Ultram) 50 mg Q6H  PRN PO PAIN;  Start 11/10/17 at 04:30











TESSIE INFANTE MD Nov 10, 2017 11:57

## 2017-11-10 NOTE — PN
Date/Time of Note


Date/Time of Note


DATE: 11/10/17 


TIME: 11:10





Assessment/Plan


Lines/Catheters


IV Catheter Type (from [facility]):  Saline Lock


Contreras in Place (from [facility]):  Yes





Assessment/Plan


Chief Complaint/Hosp Course


1. Choledocholithiasis with pneumobilia: CT:   Severe extensive 

choledocholithiasis with mild obstructive biliary, dilatation minimal 

intrahepatic and extrahepatic pneumobilia; S/p ERCP w stent and stone removal; 

no minerva at this point per gi


-abx


-pain management


-eventual lap minerva w stent removal after stones are cleared and per gi


2.  Diffuse fatty infiltration of the liver


-diet and weight management


3. Abdominal pain: 2/2 #1: improved


-pain management


4. Transaminitis: likely 2/2 #1, 2


-as above


5. Hypertension


-medical management-bp optimization


-weight management


6. Overweight: bmi 25


-diet and exercise optimization


-encourage weight loss


7. Urinary retention-s/p contreras


-recommend dc contreras as soon as able





Thank you. Patient seen and examined in collaboration with Dr. Juancarlos Molina.


Problems:  





Subjective


24 Hr Interval Summary


Myalgias. Urine retention overnight-s/p contreras and now feels better. No fevers, 

chills, sob, congested cough, cp, palpitations, ha, dizziness, n/v/d/dysuria.





Exam/Review of Systems


Vital Signs


Vitals





 Vital Signs








  Date Time  Temp Pulse Resp B/P Pulse Ox O2 Delivery O2 Flow Rate FiO2


 


11/10/17 09:56    154/81    


 


11/10/17 09:35  80 18  94   


 


11/10/17 07:50 98.6       


 


11/8/17 22:04      Room Air  


 


11/8/17 21:34       8.0 














 Intake and Output   


 


 11/9/17 11/9/17 11/10/17





 14:59 22:59 06:59


 


Intake Total  1280 ml 580 ml


 


Output Total   1100 ml


 


Balance  1280 ml -520 ml











Exam


Free Text/Dictation


Constitutional:  alert, oriented, well developed


Psych:  anxiety


Head:  atraumatic, normocephalic


Eyes:  nl lids, nl sclera


ENMT:  mucosa pink and moist, nl nasal mucosa & septum


Neck:  non-tender, supple


Respiratory:  normal air movement, 


   No congested cough


Cardiovascular:  nl pulses, regular rate and rhythm


Gastrointestinal:   soft, min tender


Genitourinary - Female:  nl adnexae, nl external genitalia; contreras


Musculoskeletal:  nl extremities to inspection, nl gait and stance


Extremities:  normal pulses


Neurological:  nl mental status, nl speech, nl strength


Skin:  No rash or lesions


Lymph:  nl lymph nodes





Results


Result Diagram:  


11/9/17 0438                                                                   

             11/9/17 0438














BA WREN NP Nov 10, 2017 11:12

## 2017-11-11 VITALS — RESPIRATION RATE: 20 BRPM | SYSTOLIC BLOOD PRESSURE: 174 MMHG | DIASTOLIC BLOOD PRESSURE: 90 MMHG

## 2017-11-11 VITALS — DIASTOLIC BLOOD PRESSURE: 72 MMHG | SYSTOLIC BLOOD PRESSURE: 155 MMHG

## 2017-11-11 VITALS — DIASTOLIC BLOOD PRESSURE: 77 MMHG | SYSTOLIC BLOOD PRESSURE: 148 MMHG

## 2017-11-11 VITALS — RESPIRATION RATE: 18 BRPM | SYSTOLIC BLOOD PRESSURE: 168 MMHG | DIASTOLIC BLOOD PRESSURE: 87 MMHG

## 2017-11-11 RX ADMIN — HYDROMORPHONE HYDROCHLORIDE PRN MG: 1 INJECTION, SOLUTION INTRAMUSCULAR; INTRAVENOUS; SUBCUTANEOUS at 04:42

## 2017-11-11 RX ADMIN — SENNOSIDES SCH TAB: 8.6 TABLET, FILM COATED ORAL at 08:24

## 2017-11-11 RX ADMIN — LOSARTAN POTASSIUM SCH MG: 50 TABLET, FILM COATED ORAL at 08:24

## 2017-11-11 RX ADMIN — PIPERACILLIN AND TAZOBACTAM SCH MLS/HR: 3; .375 INJECTION, POWDER, LYOPHILIZED, FOR SOLUTION INTRAVENOUS; PARENTERAL at 14:19

## 2017-11-11 RX ADMIN — METHADONE HYDROCHLORIDE SCH MG: 10 TABLET ORAL at 08:23

## 2017-11-11 RX ADMIN — PIPERACILLIN AND TAZOBACTAM SCH MLS/HR: 3; .375 INJECTION, POWDER, LYOPHILIZED, FOR SOLUTION INTRAVENOUS; PARENTERAL at 04:42

## 2017-11-11 RX ADMIN — HYDRALAZINE HYDROCHLORIDE PRN MG: 20 INJECTION INTRAMUSCULAR; INTRAVENOUS at 02:40

## 2017-11-11 RX ADMIN — FOLIC ACID SCH MLS/HR: 5 INJECTION, SOLUTION INTRAMUSCULAR; INTRAVENOUS; SUBCUTANEOUS at 09:32

## 2017-11-11 RX ADMIN — AMLODIPINE BESYLATE SCH MG: 10 TABLET ORAL at 08:24

## 2017-11-11 NOTE — DS
Date/Time of Note


Date/Time of Note


DATE: 11/11/17 


TIME: 13:55





Discharge Summary


Admission/Discharge Info


Admit Date/Time


Nov 7, 2017 at 20:08


Discharge Date/Time





Discharge Diagnosis


1. Cholelithiasis and choledocholithiasis with mild obstructive biliary 

dilatation, s/p ERCP with CBD stent 11/8/2017, follow up with Dr. Umana or 

further treatment(need to repeat ERCP)


2. Hypertension, antihypertensives


3. Transaminitis, due to choledocholithiasis and fatty liver


4. Chronic pain on methadone


Patient Condition:  Stable


Hx of Present Illness





Hospital Course


62-year-old female with a history of alcohol abuse, asthma, depression, chronic 

pain syndrome severe biliary and pancreatic obstruction, concerning for 

neoplastic obstruction.  Patient presented to the ER complaining of left flank 

pain 2 days.  Pain was of sudden onset and its nonradiating.  Denied 

associated fever, chills or vomiting.  She also denied any trauma.  She said 

that she drinks a few cans of beer on a daily basis.  Patient was admitted here 

in 2016 for abdominal pain, at that time MRCP showed severe biliary and 

pancreatic obstruction, concerning for neoplastic obstruction.  She underwent 

ERCP which showed Markedly dilated common bile duct common hepatic duct and 

intrahepatic and probable papillary stenosis.  She did have a sphincterotomy.  

Unfortunately patient left AGAINST MEDICAL ADVICE before completion of workup.


When she presented to the ER, CT abdomen/pelvis showed severe extensive 

choledocholithiasis with mild obstructive biliary dilatation. The degree of 

biliary dilatation is actually improved when compared to the prior studies. The 

numerous stones within the duct are markedly worse when compared to the prior 

study from 2016.  Labs shows an AST of 83, ALT 80.  Urinalysis is not really 

consistent with UTI.  Patient was scheduled for discharge on November 10, 2017 

after getting clearance from consultant teams, however she apparently had some 

weakness symptoms and stayed overnight.  She has been ambulating in the hallway 

today will be discharged home today on November 11, 2017 in improved condition.





MRCP revealed significant dilatation of the common bile duct, measuring 

approximately 20 mm in maximal diameter. Mild to moderate intrahepatic biliary 

dilatation is seen as well. Numerous stones are present within the common 

hepatic and common bile ducts, measuring up to approximately 15 mm. Gallbladder 

distension of cholelithiasis are noted, without evidence for cholecystitis. 

There is significant dilatation of the main pancreatic duct as well. 

Splenomegaly is noted.





Patient got ERCP with CBD stent on 11/8/2017 without complication. Patient is 

afebrile, tolerates diet. She will need follow up with Dr. Umana to get  

additional endoscopic intervention with Spyglass lithotripsy and stone removal 

in the near future. Patient is aware of this. Cholecystectomy will be scheduled 

after the CBD stones are out and taken care of.

















1. Choledocholithiasis with pneumobilia: CT:   Severe extensive 

choledocholithiasis with mild obstructive biliary, dilatation minimal 

intrahepatic and extrahepatic pneumobilia; S/p ERCP w stent and stone removal; 

no minerva at this point per gi


-abx


-pain management


-eventual lap minerva w stent removal after stones are cleared and per gi


2.  Diffuse fatty infiltration of the liver


-diet and weight management


3. Abdominal pain: 2/2 #1: improved


-pain management


4. Transaminitis: likely 2/2 #1, 2


-as above


5. Hypertension


-medical management-bp optimization


-weight management


6. Overweight: bmi 25


-diet and exercise optimization


-encourage weight loss


7. Urinary retention-s/p contreras


-defer to urology/pmd





Thank you,


Home Meds


Active Scripts


Methadone Hcl* (Methadone*) 10 Mg Tab, 60 MG PO DAILY for 2 Days, TAB


   Prov:RITA DE LA CRUZ MD         11/10/17


Sennosides* (Senokot*) 8.6 Mg Tablet, 2 TAB PO BID for 30 Days, TAB


   Prov:LACEY,MYRA V. NP         5/31/16


Multivitamins* (Theragran*) 1 Tab Tab, 1 TAB PO DAILY for 30 Days, TAB


   Prov:LACEY,MYRA V. NP         5/31/16


Ibuprofen* (Ibuprofen*) 400 Mg Tablet, 400 MG PO Q6H for 30 Days, TAB


   Prov:LACEY,MYRA V. NP         5/31/16


Reported Medications


Losartan Potassium* (Losartan Potassium*) 50 Mg Tablet, 50 MG PO DAILY, TAB


   11/7/17


Amlodipine Besylate* (Amlodipine Besylate*) 10 Mg Tablet, 10 MG PO DAILY, #30 

TAB


   11/7/17


Clonazepam* (Clonazepam*) 2 Mg Tablet, 2 MG PO BID, TAB


   11/7/17


Discontinued Reported Medications


Methadone Hcl* (Methadone*) 10 Mg Tab, 60 MG PO DAILY, TAB


   6/8/16


Discontinued Scripts


Amlodipine Besylate* (Amlodipine Besylate*) 5 Mg Tablet, 5 MG PO BID for 30 Days

, TAB


   Prov:LACEY,MYRA V. NP         5/31/16


Bisacodyl* (Bisacodyl*) 5 Mg Tablet.dr, 5 MG PO DAILY Y for CONSTIPATION for 30 

Days


   Prov:LACEY,MYRA V. NP         5/31/16


Methadone Hcl* (Methadone*) 10 Mg Tab, 60 MG PO DAILY for 30 Days, TAB


   Prov:LACEY,MYRA V. NP         5/31/16


Hydralazine Hcl* (Apresoline*) 10 Mg Tab, 10 MG PO Q8 for 30 Days, TAB


   Prov:LACEY,MYRA V. NP         5/31/16


Acetaminophen* (Acephen*) 650 Mg Supp, 650 MG ND Q6H Y for PAIN LEVEL 1-3 OR 

FEVER for 30 Days, SUPP


   Prov:LACEYMYRA V. NP         5/31/16


Follow-up Plan


Dr. Umana in one week


PCP in one week


Primary Care Provider


Don Hernandez


Time spent on discharge:   > 30 minutes











RADHA ESCALANTE Nov 11, 2017 13:56

## 2017-11-11 NOTE — PN
Date/Time of Note


Date/Time of Note


DATE: 11/11/17 


TIME: 11:37





Assessment/Plan


Lines/Catheters


IV Catheter Type (from [facility]):  Saline Lock


Contreras in Place (from [facility]):  Yes





Assessment/Plan


Chief Complaint/Hosp Course


1. Choledocholithiasis with pneumobilia: CT:   Severe extensive 

choledocholithiasis with mild obstructive biliary, dilatation minimal 

intrahepatic and extrahepatic pneumobilia; S/p ERCP w stent and stone removal; 

no minerva at this point per gi


-abx


-pain management


-eventual lap minerva w stent removal after stones are cleared and per gi


2.  Diffuse fatty infiltration of the liver


-diet and weight management


3. Abdominal pain: 2/2 #1: improved


-pain management


4. Transaminitis: likely 2/2 #1, 2


-as above


5. Hypertension


-medical management-bp optimization


-weight management


6. Overweight: bmi 25


-diet and exercise optimization


-encourage weight loss


7. Urinary retention-s/p contreras


-defer to urology/pmd





Thank you,


Problems:  





Subjective


24 Hr Interval Summary


Myalgias. Urine retention s/p contreras, improved. No fevers, chills, sob, 

congested cough, cp, palpitations, ha, dizziness, n/v/d/dysuria.





Exam/Review of Systems


Vital Signs


Vitals





 Vital Signs








  Date Time  Temp Pulse Resp B/P Pulse Ox O2 Delivery O2 Flow Rate FiO2


 


11/11/17 09:15    148/77    


 


11/11/17 08:00 99.0 88 18  96   


 


11/8/17 22:04      Room Air  


 


11/8/17 21:34       8.0 














 Intake and Output   


 


 11/10/17 11/10/17 11/11/17





 15:00 23:00 07:00


 


Intake Total  2100 ml 1450 ml


 


Output Total  880 ml 


 


Balance  1220 ml 1450 ml











Exam


Free Text/Dictation


Constitutional:  alert, oriented, well developed


Psych:  anxiety


Head:  atraumatic, normocephalic


Eyes:  nl lids, nl sclera


ENMT:  mucosa pink and moist, nl nasal mucosa & septum


Neck:  non-tender, supple


Respiratory:  normal air movement, No congested cough


Cardiovascular:  nl pulses, regular rate and rhythm


Gastrointestinal:   soft, min tender


Genitourinary - Female:  nl adnexae, nl external genitalia; contreras


Musculoskeletal:  nl extremities to inspection, nl gait and stance


Extremities:  normal pulses


Neurological:  nl mental status, nl speech, nl strength


Skin:  No rash or lesions


Lymph:  nl lymph nodes





Results


Result Diagram:  


11/9/17 0438                                                                   

             11/9/17 0438














ALISHA DUMONT MD Nov 11, 2017 11:38

## 2017-11-11 NOTE — CONS
Date/Time of Note


Date/Time of Note


DATE: 11/11/17 


TIME: 21:43





Assessment/Plan


Assessment/Plan


Chief Complaint/Hosp Course


Assessment:


Choledocholithiasis


   ERCP 11/8/17


   Impression:


   Periampullary diverticulum


   Probable previous sphincterotomy vs ampullary deformity due to stone passage


   Significant dilatation of the biliary tree


   More than 50 CBD stones sizes up to 2 cm


   A few small stones removed


   Post biliary stent placement 7 cm, [place] 10 stent


Cholelithiasis


Hypertension


Chronic pain syndrome


History of alcohol abuse





Plan:


Hold cholecystectomy unless evidence of acute cholecystitis until biliary tree 

cleared from stones


Patient will required additional endoscopic intervention with Spyglass 

lithotripsy and stone removal in the near future, can be done as outpatient 


Continue close observation


Pain management 


if patient tolerates po and no other acute hospitalization issue, pt can be 

discharged to f/u with Dr. Umana for Spyglass lithotripsy


Problems:  





Consultation Date/Type/Reason


Admit Date/Time


Nov 7, 2017 at 20:08


Initial Consult Date


11/8/17


Type of Consultation:  GI


Referring Provider:  NELIDA SALINAS DO





24 HR Interval Summary


Free Text/Dictation


no n/v, no abdominal pain





Exam/Review of Systems


Vital Signs


Vitals





 Vital Signs








  Date Time  Temp Pulse Resp B/P Pulse Ox O2 Delivery O2 Flow Rate FiO2


 


11/11/17 09:15    148/77    


 


11/11/17 08:00 99.0 88 18  96   


 


11/8/17 22:04      Room Air  


 


11/8/17 21:34       8.0 














 Intake and Output   


 


 11/10/17 11/10/17 11/11/17





 15:00 23:00 07:00


 


Intake Total  2100 ml 1450 ml


 


Output Total  880 ml 


 


Balance  1220 ml 1450 ml











Exam


Head:  atraumatic, normocephalic


Eyes:  EOMI, nl conjunctiva, nl lids


ENMT:  nl external ears & nose, nl lips & teeth, nl nasal mucosa & septum


Neck:  non-tender, supple


Respiratory:  clear to auscultation, normal air movement


Cardiovascular:  nl pulses, regular rate and rhythm


Gastrointestinal:  bowel sounds, non-tender, soft





Results


Result Diagram:  


11/9/17 0438                                                                   

             11/9/17 0438














TESSIE INFANTE MD Nov 11, 2017 21:44

## 2017-11-18 ENCOUNTER — HOSPITAL ENCOUNTER (EMERGENCY)
Dept: HOSPITAL 10 - FTE | Age: 62
Discharge: LEFT BEFORE DISCHARGE | End: 2017-11-18
Payer: MEDICARE

## 2017-11-18 VITALS
WEIGHT: 160.34 LBS | BODY MASS INDEX: 25.17 KG/M2 | HEIGHT: 67 IN | WEIGHT: 160.34 LBS | HEIGHT: 67 IN | BODY MASS INDEX: 25.17 KG/M2

## 2017-11-18 DIAGNOSIS — F17.210: ICD-10-CM

## 2017-11-18 DIAGNOSIS — I10: ICD-10-CM

## 2017-11-18 DIAGNOSIS — M25.562: Primary | ICD-10-CM

## 2017-11-18 PROCEDURE — 99282 EMERGENCY DEPT VISIT SF MDM: CPT

## 2017-11-18 NOTE — ERD
ER Documentation


Chief Complaint


Chief Complaint


left knee pain since yesterday. denies injury





HPI


Patient is a 62-year-old female with a past medical history of hypertension and 

depression who presents to the ED for concerns of left knee pain 1 day.  

Patient's denies any falls or trauma.  Patient states she has pain with moving 

her knee.  Patient denies any numbness or tingling.  Patient denies any recent 

travel, recent surgeries, history of DVT or hormonal use.  Patient reports pain 

with ambulating.  Patient denies taking any medication for symptoms.  Patient 

denies any redness, warmth or swelling to her knee. Patient denies any fever or 

chills. Patient denies any chest pain, shortness of breath, loss of 

consciousness.  Denies any homicidal or suicidal ideations at this time.





ROS


All systems reviewed and are negative except as per history of present illness.





Medications


Home Meds


Active Scripts


Methadone Hcl* (Methadone*) 10 Mg Tab, 60 MG PO DAILY for 2 Days, TAB


   Prov:RITA DE LA CRUZ MD         11/10/17


Sennosides* (Senokot*) 8.6 Mg Tablet, 2 TAB PO BID for 30 Days, TAB


   Prov:LACEYSHANICEA V. NP         5/31/16


Multivitamins* (Theragran*) 1 Tab Tab, 1 TAB PO DAILY for 30 Days, TAB


   Prov:LACEYMYRA V. NP         5/31/16


Ibuprofen* (Ibuprofen*) 400 Mg Tablet, 400 MG PO Q6H for 30 Days, TAB


   Prov:LACEYMYRA V. NP         5/31/16


Reported Medications


Losartan Potassium* (Losartan Potassium*) 50 Mg Tablet, 50 MG PO DAILY, TAB


   11/7/17


Amlodipine Besylate* (Amlodipine Besylate*) 10 Mg Tablet, 10 MG PO DAILY, #30 

TAB


   11/7/17


Clonazepam* (Clonazepam*) 2 Mg Tablet, 2 MG PO BID, TAB


   11/7/17





Allergies


Allergies:  


Coded Allergies:  


     vancomycin (Unverified  Allergy, Severe, SWELLING, 11/18/17)


     peach (Unverified  Allergy, Unknown, Hives, 11/18/17)





PMhx/Soc


History of Surgery:  Yes (Back sx, tonsillectomy, right ankle sx)


Anesthesia Reaction:  No


Hx Neurological Disorder:  No


Hx Respiratory Disorders:  No


Hx Cardiac Disorders:  Yes (HTN)


Hx Psychiatric Problems:  Yes (Depression, anxiety )


Hx Miscellaneous Medical Probl:  No


Hx Alcohol Use:  Yes (Occasionally, last beer 11/6/17)


Hx Substance Use:  No


Hx Tobacco Use:  Yes


Smoking Status:  Current every day smoker





Physical Exam


Vitals





Vital Signs








  Date Time  Temp Pulse Resp B/P Pulse Ox O2 Delivery O2 Flow Rate FiO2


 


11/18/17 05:39 97.6 82 20 172/100 97   








Physical Exam


GENERAL: Well-developed, well-nourished female. Appears in no acute distress. 


HEAD: Normocephalic, atraumatic. 


EYES: Pupils are equally reactive bilaterally. EOMs grossly intact. No 

conjunctival erythema. 


ENT: Moist mucous membranes. No uvula deviation. No kissing tonsils. 


NECK: Supple. No meningismus. Normal range of motion of the neck.


LUNG: Clear to auscultation bilaterally. No rhonchi, wheezing, rales or coarse 

breath sounds. 


HEART: Regular rate and rhythm. No murmurs, rubs or gallops.


EXTREMITIES: Equal pulses bilaterally. No peripheral clubbing, cyanosis or 

edema. No unilateral leg swelling.


NEUROLOGIC: Alert and oriented. Moving all four extremities without any 

difficulty. Normal speech. 


SKIN: Normal color. Warm and dry. No rashes or lesions.


LEFT KNEE: No deformity, erythema, ecchymosis or swelling. Skin intact. 

Decreased ROM of knee secondary to pain. No valgus/varus instability. Sensation 

intact to light touch. Neurovascularly intact. (Able to plantarflex, dorsiflex, 

giacomo foot, invert foot, raise big toe.) 2+ DP and DT pulses.





Procedures/MDM


ED COURSE:


The patient was stable throughout ED course. I kept the patient and/or family 

informed of laboratory and diagnostic imaging results throughout the ED course.

  





MEDICAL DECISION MAKING:


This is a 62-year-old female presents with left knee pain 1 days.  Patient 

denies any trauma or falls.  Patient denied taking any medication for self.  

Vital signs were reviewed. Patient was afebrile.  X-ray imaging was ordered 

however patient eloped prior to undergoing x-ray imaging.  Patient eloped prior 

to signing AMA form. Patient was stable prior to elopement. Unable to recheck 

patient's BP prior to discharge due to elopement. At this time, patient's 

presentation is consistent with left knee pain of unknown etiology.  Unable to 

rule out any fractures or dislocations at this time.  Low suspicion for 

cellulitis, septic joint, DVT or compartment syndrome.








DISCHARGE:


Patient elopement prior to receiving any discharge paperwork. Patient was 

stable prior to elopement. 





Patients blood pressure was elevated (>120/80) but appears stable without 

evidence of hypertensive emergency, hypertensive urgency or end-organ failure. 

I had discussion with the patient about the risks of hypertension. I have 

advised the patient to follow up with his/her primary care physician for 

outpatient monitoring and treatment for hypertension in 2-3 days. I have 

instructed the patient to return to the ER for any new or worsening symptoms 

including chest pain, shortness of breath, headache, blurred vision, confusion, 

nausea, vomiting or LOC. 





Disclaimer: Inadvertent spelling and grammatical errors are likely due to EHR/

dictation software use and do not reflect on the overall quality of patient 

care. Also, please note that the electronic time recorded on this note does not 

necessarily reflect the actual time of the patient encounter.





Departure


Diagnosis:  


 Primary Impression:  


 Left knee pain


 Chronicity:  acute  Qualified Code:  M25.562 - Acute pain of left knee


Condition:  Stable


Patient Instructions:  Knee Pain, Uncertain Cause


Referrals:  


[facility]


YOU HAVE RECEIVED A MEDICAL SCREENING EXAM AND THE RESULTS INDICATE THAT YOU DO 

NOT HAVE A CONDITION THAT REQUIRES URGENT TREATMENT IN THE EMERGENCY DEPARTMENT.





FURTHER EVALUATION AND TREATMENT OF YOUR CONDITION CAN WAIT UNTIL YOU ARE SEEN 

IN YOUR DOCTORS OFFICE WITHIN THE NEXT 1-2 DAYS. IT IS YOUR RESPONSIBILITY TO 

MAKE AN APPOINTMENT FOR FOLOW-UP CARE.





IF YOU HAVE A PRIMARY DOCTOR


--you should call your primary doctor and schedule an appointment





IF YOU DO NOT HAVE A PRIMARY DOCTOR YOU CAN CALL OUR PHYSICIAN REFERRAL HOTLINE 

AT


 [phone] 





IF YOU CAN NOT AFFORD TO SEE A PHYSICIAN YOU CAN CHOSE FROM THE FOLLOWING 

[facility] CLINICS





[facility] [phone]. [facility] [phone] [address]. [facility] [phone] [address] [facility] [phone]. [facility] [phone] [address] [facility]. [facility]. [phone] [address]. [facility]


YOU HAVE RECEIVED A MEDICAL SCREENING EXAM AND THE RESULTS INDICATE THAT YOU DO 

NOT HAVE A CONDITION THAT REQUIRES URGENT TREATMENT IN THE EMERGENCY DEPARTMENT.





FURTHER EVALUATION AND TREATMENT OF YOUR CONDITION CAN WAIT UNTIL YOU ARE SEEN 

IN YOUR DOCTORS OFFICE WITHIN THE NEXT 1-2 DAYS. IT IS YOUR RESPONSIBILITY TO 

MAKE AN APPOINTMENT FOR FOLOW-UP CARE.





IF YOU HAVE A PRIMARY DOCTOR


--you should call your primary doctor and schedule and appointment





IF YOU DO NOT HAVE A PRIMARY DOCTOR YOU CAN CALL OUR PHYSICIAN REFERRAL HOTLINE 

AT [phone].





IF YOU CAN NOT AFFORD TO SEE A PHYSICIAN YOU CAN CHOSE FROM THE FOLLOWING 

[facility] INSTITUTIONS:





[facility]


[address] [place] [address]





[facility]


[address] [place] [address]





[facility]


[address] [place] [address]





[facility]


Hours: Mon-Fri


9:00 AM - 5:00 PM





Additional Instructions:  


Call your primary care doctor TOMORROW for an appointment during the next 1-2 

days.See the doctor sooner or return here if your condition worsens before your 

appointment time.











ANTHONY ALVES PA-C Nov 18, 2017 07:10

## 2019-10-29 ENCOUNTER — HOSPITAL ENCOUNTER (EMERGENCY)
Dept: HOSPITAL 10 - E/R | Age: 64
Discharge: HOME | End: 2019-10-29
Payer: MEDICARE

## 2019-10-29 VITALS — DIASTOLIC BLOOD PRESSURE: 75 MMHG | RESPIRATION RATE: 18 BRPM | SYSTOLIC BLOOD PRESSURE: 165 MMHG | HEART RATE: 98 BPM

## 2019-10-29 VITALS
BODY MASS INDEX: 22.02 KG/M2 | BODY MASS INDEX: 22.02 KG/M2 | HEIGHT: 67 IN | WEIGHT: 140.3 LBS | HEIGHT: 67 IN | WEIGHT: 140.3 LBS

## 2019-10-29 DIAGNOSIS — R40.2142: ICD-10-CM
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
## 2019-10-29 DIAGNOSIS — R40.2252: ICD-10-CM

## 2019-10-29 DIAGNOSIS — S73.101A: Primary | ICD-10-CM

## 2019-10-29 DIAGNOSIS — I10: ICD-10-CM

## 2019-10-29 DIAGNOSIS — R40.2362: ICD-10-CM

## 2019-10-29 DIAGNOSIS — F10.10: ICD-10-CM

## 2019-10-29 DIAGNOSIS — F17.210: ICD-10-CM
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
## 2019-10-29 DIAGNOSIS — E86.0: ICD-10-CM

## 2019-10-29 PROCEDURE — 72170 X-RAY EXAM OF PELVIS: CPT

## 2019-10-29 PROCEDURE — 96375 TX/PRO/DX INJ NEW DRUG ADDON: CPT

## 2019-10-29 PROCEDURE — 71045 X-RAY EXAM CHEST 1 VIEW: CPT

## 2019-10-29 PROCEDURE — 36415 COLL VENOUS BLD VENIPUNCTURE: CPT

## 2019-10-29 PROCEDURE — 80048 BASIC METABOLIC PNL TOTAL CA: CPT

## 2019-10-29 PROCEDURE — 96374 THER/PROPH/DIAG INJ IV PUSH: CPT

## 2019-10-29 PROCEDURE — 85025 COMPLETE CBC W/AUTO DIFF WBC: CPT

## 2019-10-29 PROCEDURE — 99284 EMERGENCY DEPT VISIT MOD MDM: CPT

## 2019-11-04 ENCOUNTER — HOSPITAL ENCOUNTER (EMERGENCY)
Dept: HOSPITAL 10 - E/R | Age: 64
Discharge: LEFT BEFORE DISCHARGE | End: 2019-11-04
Payer: MEDICARE

## 2019-11-04 VITALS — HEART RATE: 97 BPM | DIASTOLIC BLOOD PRESSURE: 89 MMHG | RESPIRATION RATE: 16 BRPM | SYSTOLIC BLOOD PRESSURE: 137 MMHG

## 2019-11-04 VITALS
BODY MASS INDEX: 23.37 KG/M2 | HEIGHT: 65 IN | WEIGHT: 140.28 LBS | HEIGHT: 65 IN | WEIGHT: 140.28 LBS | BODY MASS INDEX: 23.37 KG/M2

## 2019-11-04 DIAGNOSIS — R40.2242: ICD-10-CM

## 2019-11-04 DIAGNOSIS — Z87.891: ICD-10-CM

## 2019-11-04 DIAGNOSIS — I10: ICD-10-CM

## 2019-11-04 DIAGNOSIS — R40.2362: ICD-10-CM

## 2019-11-04 DIAGNOSIS — F10.129: Primary | ICD-10-CM

## 2019-11-04 DIAGNOSIS — R40.2142: ICD-10-CM

## 2019-11-04 PROCEDURE — 99282 EMERGENCY DEPT VISIT SF MDM: CPT
